# Patient Record
Sex: MALE | Race: WHITE | NOT HISPANIC OR LATINO
[De-identification: names, ages, dates, MRNs, and addresses within clinical notes are randomized per-mention and may not be internally consistent; named-entity substitution may affect disease eponyms.]

---

## 2017-01-09 ENCOUNTER — APPOINTMENT (OUTPATIENT)
Dept: ORTHOPEDIC SURGERY | Facility: CLINIC | Age: 72
End: 2017-01-09
Payer: MEDICARE

## 2017-01-09 PROCEDURE — 99214 OFFICE O/P EST MOD 30 MIN: CPT

## 2019-02-11 ENCOUNTER — APPOINTMENT (OUTPATIENT)
Dept: ORTHOPEDIC SURGERY | Facility: CLINIC | Age: 74
End: 2019-02-11
Payer: MEDICARE

## 2019-02-11 PROCEDURE — 72100 X-RAY EXAM L-S SPINE 2/3 VWS: CPT

## 2019-02-11 PROCEDURE — 99215 OFFICE O/P EST HI 40 MIN: CPT

## 2019-02-11 NOTE — PHYSICAL EXAM
[] : Motor: [Normal] : Oriented to person, place, and time, insight and judgement were intact and the affect was normal [Motor Strength Lower Extremities] : left (weak) [NL] : normal and symmetric bilaterally [UE/LE] : Sensory: Intact in bilateral upper & lower extremities [0] : right patella 0 [1+] : left patella 1+ [ALL] : dorsalis pedis, posterior tibial, femoral, popliteal, and radial 2+ and symmetric bilaterally [Antalgic] : antalgic [Michael's Sign] : negative Michael's sign [Pronator Drift] : negative pronator drift [SLR] : negative straight leg raise [de-identified] : Range of Motion: is painful, but is full. Lumbar. \par NTTP L spine and b/l paraspinals lumbar region\par Skin intact L spine and all 4 extremities. No rashes, ulcers, blisters. \par No lymphedema. 	\par  \par  [de-identified] : 2 views AP and Lat of Lumbar Spine from [id number] 02/11/19. Read and dictated by Dr. Dalton Cárdenas Board Certified Orthopaedic surgeon isthmic spondylolisthesis L5S1\par \par XRAY of L Spine - isthmic spondylolisthesis L5S1 grade 1, Scoliosis\par MRI of L Spine - isthmic spondylolisthesis L5S1, foraminal stenosis. \par

## 2019-02-11 NOTE — HISTORY OF PRESENT ILLNESS
[Prolonged Standing] : worsened by prolonged standing [Walking] : worsened by walking [Chiropractic] : relieved by chiropractic manipulation [Rest] : relieved by rest [Acetaminophen] : relieved by acetaminophen [NSAIDs] : relieved by nonsteroidal anti-inflammatory drugs [de-identified] : Pt with isthmic spondylolisthesis and spinal stenosis with L5 radiculopathy, failed conservative management and care. Presents today for for follow up. Symptoms are stable since last visit. Pt states he feels like he walks on "sandpaper" with left foot. He denies radiating pain or weakness to B/L LE . Pt takes Tylenol and Advil PRN. He had 2 lumbar HEATHER's by Dr. Sow, which helped 1-2 days.  He had PT in the past. Pt's wife states he doesn’t remember having HEATHRE in the past, she has concerns about memory loss [Bending] : not worsened by bending [Physical Therapy] : not relieved by physical therapy [Incontinence] : no incontinence [Urinary Ret.] : no urinary retention

## 2019-02-11 NOTE — DISCUSSION/SUMMARY
[de-identified] : 74 yo male isthmic spondylolisthesis, spinal stenosis with L5 radiculopathy, failed conservative management and care. Discussed several options, including surgical and nonsurgical. Posterior Lumbar laminectomy and instrumented spinal fusion. Update lumbar MRI. \par \par I reviewed all major risks, benefits, and complications associated with surgical intervention including but not limited to bleeding, infection, neurological injury, CSF leak, implant failure, implant migration, pedicle screw breech, pseudarthrosis, adjacent segment degeneration, hematoma, anesthetic risk, chronic pain and disability , medical complications (GI, , DVT, PE, cardiac, pulmonary, etc) and risk of mortality.\par \par  \par

## 2019-02-13 ENCOUNTER — OTHER (OUTPATIENT)
Age: 74
End: 2019-02-13

## 2021-03-15 ENCOUNTER — APPOINTMENT (OUTPATIENT)
Dept: ORTHOPEDIC SURGERY | Facility: CLINIC | Age: 76
End: 2021-03-15
Payer: MEDICARE

## 2021-03-15 PROCEDURE — 99215 OFFICE O/P EST HI 40 MIN: CPT

## 2021-03-15 PROCEDURE — 72100 X-RAY EXAM L-S SPINE 2/3 VWS: CPT

## 2021-03-17 ENCOUNTER — APPOINTMENT (OUTPATIENT)
Dept: MRI IMAGING | Facility: CLINIC | Age: 76
End: 2021-03-17
Payer: MEDICARE

## 2021-03-17 ENCOUNTER — OUTPATIENT (OUTPATIENT)
Dept: OUTPATIENT SERVICES | Facility: HOSPITAL | Age: 76
LOS: 1 days | End: 2021-03-17
Payer: MEDICARE

## 2021-03-17 DIAGNOSIS — M48.07 SPINAL STENOSIS, LUMBOSACRAL REGION: ICD-10-CM

## 2021-03-17 PROCEDURE — 72148 MRI LUMBAR SPINE W/O DYE: CPT

## 2021-03-17 PROCEDURE — G1004: CPT

## 2021-03-17 PROCEDURE — 72148 MRI LUMBAR SPINE W/O DYE: CPT | Mod: 26,ME

## 2021-03-22 ENCOUNTER — APPOINTMENT (OUTPATIENT)
Dept: ORTHOPEDIC SURGERY | Facility: CLINIC | Age: 76
End: 2021-03-22
Payer: MEDICARE

## 2021-03-22 PROCEDURE — 99215 OFFICE O/P EST HI 40 MIN: CPT

## 2021-04-20 ENCOUNTER — OUTPATIENT (OUTPATIENT)
Dept: OUTPATIENT SERVICES | Facility: HOSPITAL | Age: 76
LOS: 1 days | End: 2021-04-20
Payer: MEDICARE

## 2021-04-20 VITALS
TEMPERATURE: 98 F | WEIGHT: 175.05 LBS | SYSTOLIC BLOOD PRESSURE: 118 MMHG | RESPIRATION RATE: 16 BRPM | HEIGHT: 72 IN | DIASTOLIC BLOOD PRESSURE: 84 MMHG | HEART RATE: 93 BPM | OXYGEN SATURATION: 97 %

## 2021-04-20 DIAGNOSIS — Z29.9 ENCOUNTER FOR PROPHYLACTIC MEASURES, UNSPECIFIED: ICD-10-CM

## 2021-04-20 DIAGNOSIS — Z01.818 ENCOUNTER FOR OTHER PREPROCEDURAL EXAMINATION: ICD-10-CM

## 2021-04-20 DIAGNOSIS — M48.07 SPINAL STENOSIS, LUMBOSACRAL REGION: ICD-10-CM

## 2021-04-20 DIAGNOSIS — I48.91 UNSPECIFIED ATRIAL FIBRILLATION: ICD-10-CM

## 2021-04-20 LAB
ANION GAP SERPL CALC-SCNC: 17 MMOL/L — SIGNIFICANT CHANGE UP (ref 5–17)
BLD GP AB SCN SERPL QL: NEGATIVE — SIGNIFICANT CHANGE UP
BUN SERPL-MCNC: 25 MG/DL — HIGH (ref 7–23)
CALCIUM SERPL-MCNC: 9.2 MG/DL — SIGNIFICANT CHANGE UP (ref 8.4–10.5)
CHLORIDE SERPL-SCNC: 98 MMOL/L — SIGNIFICANT CHANGE UP (ref 96–108)
CO2 SERPL-SCNC: 23 MMOL/L — SIGNIFICANT CHANGE UP (ref 22–31)
CREAT SERPL-MCNC: 0.94 MG/DL — SIGNIFICANT CHANGE UP (ref 0.5–1.3)
GLUCOSE SERPL-MCNC: 75 MG/DL — SIGNIFICANT CHANGE UP (ref 70–99)
HCT VFR BLD CALC: 45.2 % — SIGNIFICANT CHANGE UP (ref 39–50)
HGB BLD-MCNC: 15.6 G/DL — SIGNIFICANT CHANGE UP (ref 13–17)
MCHC RBC-ENTMCNC: 34.5 GM/DL — SIGNIFICANT CHANGE UP (ref 32–36)
MCHC RBC-ENTMCNC: 36 PG — HIGH (ref 27–34)
MCV RBC AUTO: 104.4 FL — HIGH (ref 80–100)
NRBC # BLD: 0 /100 WBCS — SIGNIFICANT CHANGE UP (ref 0–0)
PLATELET # BLD AUTO: 247 K/UL — SIGNIFICANT CHANGE UP (ref 150–400)
POTASSIUM SERPL-MCNC: 4.8 MMOL/L — SIGNIFICANT CHANGE UP (ref 3.5–5.3)
POTASSIUM SERPL-SCNC: 4.8 MMOL/L — SIGNIFICANT CHANGE UP (ref 3.5–5.3)
RBC # BLD: 4.33 M/UL — SIGNIFICANT CHANGE UP (ref 4.2–5.8)
RBC # FLD: 11.9 % — SIGNIFICANT CHANGE UP (ref 10.3–14.5)
RH IG SCN BLD-IMP: NEGATIVE — SIGNIFICANT CHANGE UP
SODIUM SERPL-SCNC: 138 MMOL/L — SIGNIFICANT CHANGE UP (ref 135–145)
WBC # BLD: 5.9 K/UL — SIGNIFICANT CHANGE UP (ref 3.8–10.5)
WBC # FLD AUTO: 5.9 K/UL — SIGNIFICANT CHANGE UP (ref 3.8–10.5)

## 2021-04-20 PROCEDURE — G0463: CPT

## 2021-04-20 PROCEDURE — 86901 BLOOD TYPING SEROLOGIC RH(D): CPT

## 2021-04-20 PROCEDURE — 80048 BASIC METABOLIC PNL TOTAL CA: CPT

## 2021-04-20 PROCEDURE — 85027 COMPLETE CBC AUTOMATED: CPT

## 2021-04-20 PROCEDURE — 87640 STAPH A DNA AMP PROBE: CPT

## 2021-04-20 PROCEDURE — 87641 MR-STAPH DNA AMP PROBE: CPT

## 2021-04-20 PROCEDURE — 83036 HEMOGLOBIN GLYCOSYLATED A1C: CPT

## 2021-04-20 PROCEDURE — 86850 RBC ANTIBODY SCREEN: CPT

## 2021-04-20 PROCEDURE — 86900 BLOOD TYPING SEROLOGIC ABO: CPT

## 2021-04-20 RX ORDER — CHLORHEXIDINE GLUCONATE 213 G/1000ML
1 SOLUTION TOPICAL ONCE
Refills: 0 | Status: DISCONTINUED | OUTPATIENT
Start: 2021-05-04 | End: 2021-05-04

## 2021-04-20 RX ORDER — LIDOCAINE HCL 20 MG/ML
0.2 VIAL (ML) INJECTION ONCE
Refills: 0 | Status: DISCONTINUED | OUTPATIENT
Start: 2021-05-04 | End: 2021-05-04

## 2021-04-20 RX ORDER — SODIUM CHLORIDE 9 MG/ML
3 INJECTION INTRAMUSCULAR; INTRAVENOUS; SUBCUTANEOUS EVERY 8 HOURS
Refills: 0 | Status: DISCONTINUED | OUTPATIENT
Start: 2021-05-04 | End: 2021-05-04

## 2021-04-20 RX ORDER — CEFAZOLIN SODIUM 1 G
2000 VIAL (EA) INJECTION ONCE
Refills: 0 | Status: DISCONTINUED | OUTPATIENT
Start: 2021-05-04 | End: 2021-05-06

## 2021-04-20 NOTE — H&P PST ADULT - NSANTHOSAYNRD_GEN_A_CORE
No. HILARIA screening performed.  STOP BANG Legend: 0-2 = LOW Risk; 3-4 = INTERMEDIATE Risk; 5-8 = HIGH Risk

## 2021-04-20 NOTE — H&P PST ADULT - NSICDXPASTMEDICALHX_GEN_ALL_CORE_FT
PAST MEDICAL HISTORY:  Afib      PAST MEDICAL HISTORY:  Afib On Aspirin, metoprolol, digoxin    Spinal stenosis

## 2021-04-20 NOTE — H&P PST ADULT - ASSESSMENT
FRANCISCAI VTE 2.0 SCORE [CLOT updated 2019]    AGE RELATED RISK FACTORS                                                       MOBILITY RELATED FACTORS  [ ] Age 41-60 years                                            (1 Point)                    [ ] Bed rest                                                        (1 Point)  [ ] Age: 61-74 years                                           (2 Points)                  [ ] Plaster cast                                                   (2 Points)  [ x] Age= 75 years                                              (3 Points)                    [ ] Bed bound for more than 72 hours                 (2 Points)    DISEASE RELATED RISK FACTORS                                               GENDER SPECIFIC FACTORS  [ x] Edema in the lower extremities                       (1 Point)              [ ] Pregnancy                                                     (1 Point)  [ ] Varicose veins                                               (1 Point)                     [ ] Post-partum < 6 weeks                                   (1 Point)             [ ] BMI > 25 Kg/m2                                            (1 Point)                     [ ] Hormonal therapy  or oral contraception          (1 Point)                 [ ] Sepsis (in the previous month)                        (1 Point)               [ ] History of pregnancy complications                 (1 point)  [ ] Pneumonia or serious lung disease                                               [ ] Unexplained or recurrent                     (1 Point)           (in the previous month)                               (1 Point)  [ ] Abnormal pulmonary function test                     (1 Point)                 SURGERY RELATED RISK FACTORS  [ ] Acute myocardial infarction                              (1 Point)               [ ]  Section                                             (1 Point)  [ ] Congestive heart failure (in the previous month)  (1 Point)      [ ] Minor surgery                                                  (1 Point)   [ ] Inflammatory bowel disease                             (1 Point)               [ ] Arthroscopic surgery                                        (2 Points)  [ ] Central venous access                                      (2 Points)                [x ] General surgery lasting more than 45 minutes (2 points)  [ ] Malignancy- Present or previous                   (2 Points)                [ ] Elective arthroplasty                                         (5 points)    [ ] Stroke (in the previous month)                          (5 Points)                                                                                                                                                           HEMATOLOGY RELATED FACTORS                                                 TRAUMA RELATED RISK FACTORS  [ ] Prior episodes of VTE                                     (3 Points)                [ ] Fracture of the hip, pelvis, or leg                       (5 Points)  [ ] Positive family history for VTE                         (3 Points)             [ ] Acute spinal cord injury (in the previous month)  (5 Points)  [ ] Prothrombin 57848 A                                     (3 Points)               [ ] Paralysis  (less than 1 month)                             (5 Points)  [ ] Factor V Leiden                                             (3 Points)                  [ ] Multiple Trauma within 1 month                        (5 Points)  [ ] Lupus anticoagulants                                     (3 Points)                                                           [ ] Anticardiolipin antibodies                               (3 Points)                                                       [ ] High homocysteine in the blood                      (3 Points)                                             [ ] Other congenital or acquired thrombophilia      (3 Points)                                                [ ] Heparin induced thrombocytopenia                  (3 Points)                                     Total Score [    6      ]

## 2021-04-20 NOTE — H&P PST ADULT - ATTENDING COMMENTS
Posterior Lumbar Laminectomy and Instrumented Spinal Fusion with Interbody L3-S1 with mazor robot and navigation. I reviewed all major risks, benefits, and complications.

## 2021-04-20 NOTE — H&P PST ADULT - NSICDXPROBLEM_GEN_ALL_CORE_FT
PROBLEM DIAGNOSES  Problem: Spinal stenosis of lumbosacral region  Assessment and Plan:     Problem: Need for prophylactic measure  Assessment and Plan: The Caprini score indicates that this patient is at high risk for a VTE event (score 6 or greater). Surgical patients in this group will benefit from both pharmacologic prophylaxis and intermittent compression devices.  The surgical team will determine the balance between VTE risk and bleeding risk, and other clinical considerations         PROBLEM DIAGNOSES  Problem: Spinal stenosis of lumbosacral region  Assessment and Plan: Scheduled for L3-S1 Posterior Lumbar Laminectomy L5-S1 Instrumented spinal fusion on 05/04/2021.  Labs  Pre Op medication / education discussed      Problem: Afib  Assessment and Plan: On  Asa, metoprolol and digoxin . Advised pt to ask cardiologist for Asa plan.    Problem: Need for prophylactic measure  Assessment and Plan: The Caprini score indicates that this patient is at high risk for a VTE event (score 6 or greater). Surgical patients in this group will benefit from both pharmacologic prophylaxis and intermittent compression devices.  The surgical team will determine the balance between VTE risk and bleeding risk, and other clinical considerations         PROBLEM DIAGNOSES  Problem: Spinal stenosis of lumbosacral region  Assessment and Plan: Scheduled for L3-S1 Posterior Lumbar Laminectomy L5-S1 Instrumented spinal fusion on 05/04/2021.  Labs  Pre Op medication / education discussed      Problem: Afib  Assessment and Plan: Continue Asa ( advised by Dr Cárdenas) , metoprolol and digoxin .     Problem: Need for prophylactic measure  Assessment and Plan: The Caprini score indicates that this patient is at high risk for a VTE event (score 6 or greater). Surgical patients in this group will benefit from both pharmacologic prophylaxis and intermittent compression devices.  The surgical team will determine the balance between VTE risk and bleeding risk, and other clinical considerations

## 2021-04-20 NOTE — H&P PST ADULT - HISTORY OF PRESENT ILLNESS
76 y/o male  74 y/o male h/o afib  76 y/o male smoker h/o afib ,,isthmic spondylolisthesis and spinal stenosis with L5 radiculopathy who  failed conservative management is scheduled for L3-S1 Posterior Lumbar Laminectomy L5-S1 Instrumented spinal fusion on 05/04/2021.  Denies Recent travel, Exposure or Covid symptoms  Covid PCR Test on 05/01/2021

## 2021-04-20 NOTE — H&P PST ADULT - NS MD HP INPLANTS MED DEV
Problem: Pain:  Goal: Pain level will decrease  Description: Pain level will decrease  Outcome: Ongoing  Goal: Control of acute pain  Description: Control of acute pain  Outcome: Ongoing  Goal: Control of chronic pain  Description: Control of chronic pain  Outcome: Ongoing     Problem: Falls - Risk of:  Goal: Will remain free from falls  Description: Will remain free from falls  Outcome: Ongoing  Goal: Absence of physical injury  Description: Absence of physical injury  Outcome: Ongoing     Problem: Skin Integrity:  Goal: Will show no infection signs and symptoms  Description: Will show no infection signs and symptoms  Outcome: Ongoing  Goal: Absence of new skin breakdown  Description: Absence of new skin breakdown  Outcome: Ongoing     Problem: Cardiac:  Goal: Ability to maintain an adequate cardiac output will improve  Description: Ability to maintain an adequate cardiac output will improve  Outcome: Ongoing  Goal: Hemodynamic stability will improve  Description: Hemodynamic stability will improve  Outcome: Ongoing     Problem: Fluid Volume:  Goal: Ability to achieve and maintain adequate urine output will improve  Description: Ability to achieve and maintain adequate urine output will improve  Outcome: Ongoing     Problem: Respiratory:  Goal: Respiratory status will improve  Description: Respiratory status will improve  Outcome: Ongoing None

## 2021-04-21 LAB
A1C WITH ESTIMATED AVERAGE GLUCOSE RESULT: 5.1 % — SIGNIFICANT CHANGE UP (ref 4–5.6)
ESTIMATED AVERAGE GLUCOSE: 100 MG/DL — SIGNIFICANT CHANGE UP (ref 68–114)
MRSA PCR RESULT.: SIGNIFICANT CHANGE UP
S AUREUS DNA NOSE QL NAA+PROBE: DETECTED

## 2021-04-27 PROBLEM — I48.91 UNSPECIFIED ATRIAL FIBRILLATION: Chronic | Status: ACTIVE | Noted: 2021-04-20

## 2021-04-27 PROBLEM — M48.00 SPINAL STENOSIS, SITE UNSPECIFIED: Chronic | Status: ACTIVE | Noted: 2021-04-20

## 2021-05-01 ENCOUNTER — OUTPATIENT (OUTPATIENT)
Dept: OUTPATIENT SERVICES | Facility: HOSPITAL | Age: 76
LOS: 1 days | End: 2021-05-01

## 2021-05-01 DIAGNOSIS — Z11.52 ENCOUNTER FOR SCREENING FOR COVID-19: ICD-10-CM

## 2021-05-01 LAB — SARS-COV-2 RNA SPEC QL NAA+PROBE: SIGNIFICANT CHANGE UP

## 2021-05-03 ENCOUNTER — TRANSCRIPTION ENCOUNTER (OUTPATIENT)
Age: 76
End: 2021-05-03

## 2021-05-04 ENCOUNTER — APPOINTMENT (OUTPATIENT)
Dept: ORTHOPEDIC SURGERY | Facility: HOSPITAL | Age: 76
End: 2021-05-04

## 2021-05-04 ENCOUNTER — RESULT REVIEW (OUTPATIENT)
Age: 76
End: 2021-05-04

## 2021-05-04 ENCOUNTER — INPATIENT (INPATIENT)
Facility: HOSPITAL | Age: 76
LOS: 1 days | Discharge: HOME | DRG: 454 | End: 2021-05-06
Attending: ORTHOPAEDIC SURGERY | Admitting: ORTHOPAEDIC SURGERY
Payer: MEDICARE

## 2021-05-04 VITALS
RESPIRATION RATE: 18 BRPM | SYSTOLIC BLOOD PRESSURE: 122 MMHG | OXYGEN SATURATION: 96 % | TEMPERATURE: 98 F | WEIGHT: 173.06 LBS | DIASTOLIC BLOOD PRESSURE: 83 MMHG | HEART RATE: 91 BPM | HEIGHT: 72 IN

## 2021-05-04 DIAGNOSIS — M48.07 SPINAL STENOSIS, LUMBOSACRAL REGION: ICD-10-CM

## 2021-05-04 DIAGNOSIS — Z11.52 ENCOUNTER FOR SCREENING FOR COVID-19: ICD-10-CM

## 2021-05-04 DIAGNOSIS — M48.061 SPINAL STENOSIS, LUMBAR REGION WITHOUT NEUROGENIC CLAUDICATION: ICD-10-CM

## 2021-05-04 LAB
ANION GAP SERPL CALC-SCNC: 17 MMOL/L — SIGNIFICANT CHANGE UP (ref 5–17)
BASOPHILS # BLD AUTO: 0.01 K/UL — SIGNIFICANT CHANGE UP (ref 0–0.2)
BASOPHILS NFR BLD AUTO: 0.1 % — SIGNIFICANT CHANGE UP (ref 0–2)
BUN SERPL-MCNC: 17 MG/DL — SIGNIFICANT CHANGE UP (ref 7–23)
CALCIUM SERPL-MCNC: 8.1 MG/DL — LOW (ref 8.4–10.5)
CHLORIDE SERPL-SCNC: 102 MMOL/L — SIGNIFICANT CHANGE UP (ref 96–108)
CO2 SERPL-SCNC: 19 MMOL/L — LOW (ref 22–31)
CREAT SERPL-MCNC: 0.75 MG/DL — SIGNIFICANT CHANGE UP (ref 0.5–1.3)
EOSINOPHIL # BLD AUTO: 0 K/UL — SIGNIFICANT CHANGE UP (ref 0–0.5)
EOSINOPHIL NFR BLD AUTO: 0 % — SIGNIFICANT CHANGE UP (ref 0–6)
GAS PNL BLDA: SIGNIFICANT CHANGE UP
GLUCOSE SERPL-MCNC: 108 MG/DL — HIGH (ref 70–99)
HCT VFR BLD CALC: 38.1 % — LOW (ref 39–50)
HGB BLD-MCNC: 13.2 G/DL — SIGNIFICANT CHANGE UP (ref 13–17)
IMM GRANULOCYTES NFR BLD AUTO: 0.6 % — SIGNIFICANT CHANGE UP (ref 0–1.5)
LYMPHOCYTES # BLD AUTO: 0.51 K/UL — LOW (ref 1–3.3)
LYMPHOCYTES # BLD AUTO: 7.1 % — LOW (ref 13–44)
MCHC RBC-ENTMCNC: 34.6 GM/DL — SIGNIFICANT CHANGE UP (ref 32–36)
MCHC RBC-ENTMCNC: 36.2 PG — HIGH (ref 27–34)
MCV RBC AUTO: 104.4 FL — HIGH (ref 80–100)
MONOCYTES # BLD AUTO: 0.05 K/UL — SIGNIFICANT CHANGE UP (ref 0–0.9)
MONOCYTES NFR BLD AUTO: 0.7 % — LOW (ref 2–14)
NEUTROPHILS # BLD AUTO: 6.58 K/UL — SIGNIFICANT CHANGE UP (ref 1.8–7.4)
NEUTROPHILS NFR BLD AUTO: 91.5 % — HIGH (ref 43–77)
NRBC # BLD: 0 /100 WBCS — SIGNIFICANT CHANGE UP (ref 0–0)
PLATELET # BLD AUTO: 166 K/UL — SIGNIFICANT CHANGE UP (ref 150–400)
POTASSIUM SERPL-MCNC: 4.4 MMOL/L — SIGNIFICANT CHANGE UP (ref 3.5–5.3)
POTASSIUM SERPL-SCNC: 4.4 MMOL/L — SIGNIFICANT CHANGE UP (ref 3.5–5.3)
RBC # BLD: 3.65 M/UL — LOW (ref 4.2–5.8)
RBC # FLD: 11.7 % — SIGNIFICANT CHANGE UP (ref 10.3–14.5)
RH IG SCN BLD-IMP: NEGATIVE — SIGNIFICANT CHANGE UP
SODIUM SERPL-SCNC: 138 MMOL/L — SIGNIFICANT CHANGE UP (ref 135–145)
WBC # BLD: 7.19 K/UL — SIGNIFICANT CHANGE UP (ref 3.8–10.5)
WBC # FLD AUTO: 7.19 K/UL — SIGNIFICANT CHANGE UP (ref 3.8–10.5)

## 2021-05-04 PROCEDURE — 63012 REMOVE LAMINA/FACETS LUMBAR: CPT | Mod: 59

## 2021-05-04 PROCEDURE — 72131 CT LUMBAR SPINE W/O DYE: CPT | Mod: 26

## 2021-05-04 PROCEDURE — 61783 SCAN PROC SPINAL: CPT | Mod: 59

## 2021-05-04 PROCEDURE — 88304 TISSUE EXAM BY PATHOLOGIST: CPT | Mod: 26

## 2021-05-04 PROCEDURE — 22840 INSERT SPINE FIXATION DEVICE: CPT

## 2021-05-04 PROCEDURE — 22853 INSJ BIOMECHANICAL DEVICE: CPT

## 2021-05-04 PROCEDURE — 63047 LAM FACETEC & FORAMOT LUMBAR: CPT | Mod: 59

## 2021-05-04 PROCEDURE — 63056 DECOMPRESS SPINAL CORD LMBR: CPT | Mod: 59

## 2021-05-04 PROCEDURE — 22633 ARTHRD CMBN 1NTRSPC LUMBAR: CPT

## 2021-05-04 RX ORDER — DEXAMETHASONE 0.5 MG/5ML
1 ELIXIR ORAL EVERY 6 HOURS
Refills: 0 | Status: DISCONTINUED | OUTPATIENT
Start: 2021-05-06 | End: 2021-05-06

## 2021-05-04 RX ORDER — HYDROMORPHONE HYDROCHLORIDE 2 MG/ML
0.5 INJECTION INTRAMUSCULAR; INTRAVENOUS; SUBCUTANEOUS
Refills: 0 | Status: DISCONTINUED | OUTPATIENT
Start: 2021-05-04 | End: 2021-05-05

## 2021-05-04 RX ORDER — DEXAMETHASONE 0.5 MG/5ML
5 ELIXIR ORAL EVERY 6 HOURS
Refills: 0 | Status: COMPLETED | OUTPATIENT
Start: 2021-05-04 | End: 2021-05-05

## 2021-05-04 RX ORDER — OXYCODONE HYDROCHLORIDE 5 MG/1
5 TABLET ORAL EVERY 4 HOURS
Refills: 0 | Status: DISCONTINUED | OUTPATIENT
Start: 2021-05-04 | End: 2021-05-06

## 2021-05-04 RX ORDER — SENNA PLUS 8.6 MG/1
2 TABLET ORAL AT BEDTIME
Refills: 0 | Status: DISCONTINUED | OUTPATIENT
Start: 2021-05-04 | End: 2021-05-06

## 2021-05-04 RX ORDER — FOLIC ACID 0.8 MG
1 TABLET ORAL DAILY
Refills: 0 | Status: DISCONTINUED | OUTPATIENT
Start: 2021-05-04 | End: 2021-05-06

## 2021-05-04 RX ORDER — SODIUM CHLORIDE 9 MG/ML
1000 INJECTION, SOLUTION INTRAVENOUS
Refills: 0 | Status: DISCONTINUED | OUTPATIENT
Start: 2021-05-04 | End: 2021-05-06

## 2021-05-04 RX ORDER — DIGOXIN 250 MCG
250 TABLET ORAL DAILY
Refills: 0 | Status: DISCONTINUED | OUTPATIENT
Start: 2021-05-04 | End: 2021-05-06

## 2021-05-04 RX ORDER — MORPHINE SULFATE 50 MG/1
2 CAPSULE, EXTENDED RELEASE ORAL EVERY 4 HOURS
Refills: 0 | Status: DISCONTINUED | OUTPATIENT
Start: 2021-05-04 | End: 2021-05-06

## 2021-05-04 RX ORDER — FAMOTIDINE 10 MG/ML
20 INJECTION INTRAVENOUS EVERY 12 HOURS
Refills: 0 | Status: DISCONTINUED | OUTPATIENT
Start: 2021-05-04 | End: 2021-05-06

## 2021-05-04 RX ORDER — LANOLIN ALCOHOL/MO/W.PET/CERES
3 CREAM (GRAM) TOPICAL AT BEDTIME
Refills: 0 | Status: DISCONTINUED | OUTPATIENT
Start: 2021-05-04 | End: 2021-05-06

## 2021-05-04 RX ORDER — DIPHENHYDRAMINE HCL 50 MG
12.5 CAPSULE ORAL EVERY 4 HOURS
Refills: 0 | Status: DISCONTINUED | OUTPATIENT
Start: 2021-05-04 | End: 2021-05-06

## 2021-05-04 RX ORDER — DEXAMETHASONE 0.5 MG/5ML
3 ELIXIR ORAL EVERY 6 HOURS
Refills: 0 | Status: DISCONTINUED | OUTPATIENT
Start: 2021-05-05 | End: 2021-05-06

## 2021-05-04 RX ORDER — SODIUM CHLORIDE 9 MG/ML
500 INJECTION, SOLUTION INTRAVENOUS ONCE
Refills: 0 | Status: COMPLETED | OUTPATIENT
Start: 2021-05-04 | End: 2021-05-05

## 2021-05-04 RX ORDER — ACETAMINOPHEN 500 MG
1000 TABLET ORAL ONCE
Refills: 0 | Status: COMPLETED | OUTPATIENT
Start: 2021-05-04 | End: 2021-05-04

## 2021-05-04 RX ORDER — ASCORBIC ACID 60 MG
500 TABLET,CHEWABLE ORAL
Refills: 0 | Status: DISCONTINUED | OUTPATIENT
Start: 2021-05-04 | End: 2021-05-06

## 2021-05-04 RX ORDER — OXYCODONE HYDROCHLORIDE 5 MG/1
10 TABLET ORAL EVERY 4 HOURS
Refills: 0 | Status: DISCONTINUED | OUTPATIENT
Start: 2021-05-04 | End: 2021-05-06

## 2021-05-04 RX ORDER — ONDANSETRON 8 MG/1
4 TABLET, FILM COATED ORAL EVERY 6 HOURS
Refills: 0 | Status: DISCONTINUED | OUTPATIENT
Start: 2021-05-04 | End: 2021-05-06

## 2021-05-04 RX ORDER — CYCLOBENZAPRINE HYDROCHLORIDE 10 MG/1
10 TABLET, FILM COATED ORAL EVERY 8 HOURS
Refills: 0 | Status: DISCONTINUED | OUTPATIENT
Start: 2021-05-04 | End: 2021-05-06

## 2021-05-04 RX ORDER — ACETAMINOPHEN 500 MG
975 TABLET ORAL EVERY 8 HOURS
Refills: 0 | Status: DISCONTINUED | OUTPATIENT
Start: 2021-05-04 | End: 2021-05-06

## 2021-05-04 RX ORDER — ASPIRIN/CALCIUM CARB/MAGNESIUM 324 MG
81 TABLET ORAL DAILY
Refills: 0 | Status: DISCONTINUED | OUTPATIENT
Start: 2021-05-04 | End: 2021-05-06

## 2021-05-04 RX ORDER — CEFAZOLIN SODIUM 1 G
2000 VIAL (EA) INJECTION EVERY 8 HOURS
Refills: 0 | Status: COMPLETED | OUTPATIENT
Start: 2021-05-04 | End: 2021-05-05

## 2021-05-04 RX ORDER — TRAMADOL HYDROCHLORIDE 50 MG/1
50 TABLET ORAL EVERY 4 HOURS
Refills: 0 | Status: DISCONTINUED | OUTPATIENT
Start: 2021-05-04 | End: 2021-05-06

## 2021-05-04 RX ORDER — SODIUM CHLORIDE 9 MG/ML
1000 INJECTION, SOLUTION INTRAVENOUS
Refills: 0 | Status: DISCONTINUED | OUTPATIENT
Start: 2021-05-04 | End: 2021-05-04

## 2021-05-04 RX ORDER — BENZOCAINE AND MENTHOL 5; 1 G/100ML; G/100ML
1 LIQUID ORAL
Refills: 0 | Status: DISCONTINUED | OUTPATIENT
Start: 2021-05-04 | End: 2021-05-06

## 2021-05-04 RX ORDER — METOPROLOL TARTRATE 50 MG
150 TABLET ORAL
Refills: 0 | Status: DISCONTINUED | OUTPATIENT
Start: 2021-05-04 | End: 2021-05-06

## 2021-05-04 RX ORDER — MAGNESIUM HYDROXIDE 400 MG/1
30 TABLET, CHEWABLE ORAL EVERY 12 HOURS
Refills: 0 | Status: DISCONTINUED | OUTPATIENT
Start: 2021-05-04 | End: 2021-05-06

## 2021-05-04 RX ADMIN — SODIUM CHLORIDE 100 MILLILITER(S): 9 INJECTION, SOLUTION INTRAVENOUS at 21:21

## 2021-05-04 RX ADMIN — Medication 5 MILLIGRAM(S): at 21:33

## 2021-05-04 RX ADMIN — Medication 1 TABLET(S): at 22:29

## 2021-05-04 RX ADMIN — TRAMADOL HYDROCHLORIDE 50 MILLIGRAM(S): 50 TABLET ORAL at 21:23

## 2021-05-04 RX ADMIN — SENNA PLUS 2 TABLET(S): 8.6 TABLET ORAL at 21:34

## 2021-05-04 RX ADMIN — Medication 400 MILLIGRAM(S): at 21:08

## 2021-05-04 RX ADMIN — Medication 1000 MILLIGRAM(S): at 21:40

## 2021-05-04 RX ADMIN — TRAMADOL HYDROCHLORIDE 50 MILLIGRAM(S): 50 TABLET ORAL at 22:30

## 2021-05-04 NOTE — CHART NOTE - NSCHARTNOTEFT_GEN_A_CORE
Patient Resting without complaints.  No Chest Pain, SOB, N/V.  Pre op s/sx: LLE radiculopathy with weakness    T(C): 36.4 (05-04-21 @ 22:00), Max: 36.6 (05-04-21 @ 20:25)  HR: 98 (05-04-21 @ 21:30) (91 - 104)  BP: 95/61 (05-04-21 @ 21:30) (86/55 - 122/83)  RR: 20 (05-04-21 @ 22:00) (15 - 20)  SpO2: 97% (05-04-21 @ 21:30) (96% - 100%)  Wt(kg): --    Exam:   Alert/Oriented, No Acute Distress  Cards: +S1/S2, RRR  Pulm: CTAB           Dressing: [x] clean/dry/intact  [ ] Other:           Sensation: [ ] intact to light touch  [x] decreased: to LLE but states has improved since surgery         Motor exam: [  ]          [ ] Upper extremity                Bi          Tri        Delt                                                    R         5/5        5/5        5/5                                               L          5/5        5/5        5/5                  [ ] Lower extremity                    PF          DF         EHL       FHL                                                                                            R        5/5        4/5        4/5       5/5                                                        L         5/5       4/5        4/5       5/5                                                                  Calves Soft/Non-tender bilaterally           Toes warm well perfused; capillary refill <3 seconds              LABS:                        13.2   7.19  )-----------( 166      ( 04 May 2021 21:04 )             38.1     05-04    138  |  102  |  17  ----------------------------<  108<H>  4.4   |  19<L>  |  0.75    Ca    8.1<L>      04 May 2021 21:04          A/P :  Patient is a 75y Male s/p L3-S1 Kodak/lami, L5-S1 PSF/TLIF  -    Pain control  -    Taper  -    Resume A81 daily  -    For TOV in AM with PT  -    DVT ppx: SCDs       -    Physical Therapy  -    Weight bearing status: WBAT with LSO brace for ambulation; patient may be OOB with PT prior to brace arrival  -    FU AM Labs      Shannon Gonzalez PA-C  Team Pager #[phone] Patient Resting without complaints.  No Chest Pain, SOB, N/V.  Pre op s/sx: LLE radiculopathy with weakness    T(C): 36.4 (05-04-21 @ 22:00), Max: 36.6 (05-04-21 @ 20:25)  HR: 98 (05-04-21 @ 21:30) (91 - 104)  BP: 95/61 (05-04-21 @ 21:30) (86/55 - 122/83)  RR: 20 (05-04-21 @ 22:00) (15 - 20)  SpO2: 97% (05-04-21 @ 21:30) (96% - 100%)  Wt(kg): --    Exam:   Alert/Oriented, No Acute Distress  Cards: +S1/S2, RRR  Pulm: CTAB           Dressing: [x] clean/dry/intact  [ ] Other:           Sensation: [ ] intact to light touch  [x] decreased: to LLE but states has improved since surgery         Motor exam: [  ]          [ ] Upper extremity                Bi          Tri        Delt                                                    R         5/5        5/5        5/5                                               L          5/5        5/5        5/5                  [ ] Lower extremity                    PF          DF         EHL       FHL                                                                                            R        5/5        4/5        4/5       5/5                                                        L         5/5       4/5        4/5       5/5                                                                  Calves Soft/Non-tender bilaterally           Toes warm well perfused; capillary refill <3 seconds              LABS:                        13.2   7.19  )-----------( 166      ( 04 May 2021 21:04 )             38.1     05-04    138  |  102  |  17  ----------------------------<  108<H>  4.4   |  19<L>  |  0.75    Ca    8.1<L>      04 May 2021 21:04          A/P :  Patient is a 75y Male s/p L3-S1 Kodak/lami, L5-S1 PSF/TLIF  -    Pain control  -    Taper  -    Resume A81 daily  -    For TOV in AM with PT  -    FU CT Chest (incidental finding of right lung nodule on CXR)  -    DVT ppx: SCDs       -    Physical Therapy  -    Weight bearing status: WBAT with LSO brace for ambulation; patient may be OOB with PT prior to brace arrival  -    FU AM Labs      Shannon Gonzalez PA-C  Team Pager #[phone]

## 2021-05-04 NOTE — BRIEF OPERATIVE NOTE - OPERATION/FINDINGS
lumbar spinal stenosis, foraminal stenosis, L5-S1 isthmic spondy, Left L3-S1 michela lami, L L5-S1 TLIF and PSF

## 2021-05-04 NOTE — PRE-ANESTHESIA EVALUATION ADULT - NSANTHPMHFT_GEN_ALL_CORE
76 y/o male smoker h/o afib ,,isthmic spondylolisthesis and spinal stenosis with L5 radiculopathy who  failed conservative management is scheduled for L3-S1 Posterior Lumbar Laminectomy L5-S1 Instrumented spinal fusion on 05/04/2021.  Denies Recent travel, Exposure or Covid symptoms

## 2021-05-04 NOTE — CHART NOTE - NSCHARTNOTEFT_GEN_A_CORE
CAPRINI SCORE [CLOT]    AGE RELATED RISK FACTORS                                                       MOBILITY RELATED FACTORS  [ ] Age 41-60 years                                            (1 Point)                  [ ] Bed rest                                                        (1 Point)  [ ] Age: 61-74 years                                           (2 Points)                 [ ] Plaster cast                                                   (2 Points)  [x] Age= 75 years                                              (3 Points)                 [ ] Bed bound for more than 72 hours                 (2 Points)    DISEASE RELATED RISK FACTORS                                               GENDER SPECIFIC FACTORS  [ ] Edema in the lower extremities                       (1 Point)                  [ ] Pregnancy                                                     (1 Point)  [ ] Varicose veins                                               (1 Point)                  [ ] Post-partum < 6 weeks                                   (1 Point)             [ ] BMI > 25 Kg/m2                                            (1 Point)                  [ ] Hormonal therapy  or oral contraception          (1 Point)                 [ ] Sepsis (in the previous month)                        (1 Point)                  [ ] History of pregnancy complications                 (1 point)  [ ] Pneumonia or serious lung disease                                               [ ] Unexplained or recurrent                     (1 Point)           (in the previous month)                               (1 Point)  [ ] Abnormal pulmonary function test                     (1 Point)                 SURGERY RELATED RISK FACTORS  [ ] Acute myocardial infarction                              (1 Point)                 [ ]  Section                                             (1 Point)  [ ] Congestive heart failure (in the previous month)  (1 Point)               [ ] Minor surgery                                                  (1 Point)   [ ] Inflammatory bowel disease                             (1 Point)                 [ ] Arthroscopic surgery                                        (2 Points)  [ ] Central venous access                                      (2 Points)               [x] General surgery lasting more than 45 minutes   (2 Points)       [ ] Stroke (in the previous month)                          (5 Points)               [ ] Elective arthroplasty                                         (5 Points)                                                                                                                                               HEMATOLOGY RELATED FACTORS                                                 TRAUMA RELATED RISK FACTORS  [ ] Prior episodes of VTE                                     (3 Points)                [ ] Fracture of the hip, pelvis, or leg                       (5 Points)  [ ] Positive family history for VTE                         (3 Points)                 [ ] Acute spinal cord injury (in the previous month)  (5 Points)  [ ] Prothrombin 36346 A                                     (3 Points)                 [ ] Paralysis  (less than 1 month)                             (5 Points)  [ ] Factor V Leiden                                             (3 Points)                  [ ] Multiple Trauma within 1 month                        (5 Points)  [ ] Lupus anticoagulants                                     (3 Points)                                                           [ ] Anticardiolipin antibodies                               (3 Points)                                                       [ ] High homocysteine in the blood                      (3 Points)                                             [ ] Other congenital or acquired thrombophilia      (3 Points)                                                [ ] Heparin induced thrombocytopenia                  (3 Points)                                          Total Score [     5      ]    Caprini Score 0 - 2:  Low Risk, No VTE Prophylaxis required for most patients, encourage ambulation  Caprini Score 3 - 6:  At Risk, pharmacologic VTE prophylaxis is indicated for most patients (in the absence of a contraindication)  Caprini Score Greater than or = 7:  High Risk, pharmacologic VTE prophylaxis is indicated for most patients (in the absence of a contraindication)    With a calculated score of 5, this patient does not currently meet criteria for inpatient dopplers at this time. Will continue to reassess.    Dilcia Brand PA-C  Team Pager #[phone]

## 2021-05-05 LAB
ANION GAP SERPL CALC-SCNC: 14 MMOL/L — SIGNIFICANT CHANGE UP (ref 5–17)
BASOPHILS # BLD AUTO: 0.01 K/UL — SIGNIFICANT CHANGE UP (ref 0–0.2)
BASOPHILS NFR BLD AUTO: 0.1 % — SIGNIFICANT CHANGE UP (ref 0–2)
BUN SERPL-MCNC: 17 MG/DL — SIGNIFICANT CHANGE UP (ref 7–23)
CALCIUM SERPL-MCNC: 8.4 MG/DL — SIGNIFICANT CHANGE UP (ref 8.4–10.5)
CHLORIDE SERPL-SCNC: 99 MMOL/L — SIGNIFICANT CHANGE UP (ref 96–108)
CO2 SERPL-SCNC: 24 MMOL/L — SIGNIFICANT CHANGE UP (ref 22–31)
COVID-19 SPIKE DOMAIN AB INTERP: POSITIVE
COVID-19 SPIKE DOMAIN ANTIBODY RESULT: >250 U/ML — HIGH
CREAT SERPL-MCNC: 0.75 MG/DL — SIGNIFICANT CHANGE UP (ref 0.5–1.3)
EOSINOPHIL # BLD AUTO: 0 K/UL — SIGNIFICANT CHANGE UP (ref 0–0.5)
EOSINOPHIL NFR BLD AUTO: 0 % — SIGNIFICANT CHANGE UP (ref 0–6)
GLUCOSE SERPL-MCNC: 167 MG/DL — HIGH (ref 70–99)
HCT VFR BLD CALC: 36.8 % — LOW (ref 39–50)
HGB BLD-MCNC: 12.7 G/DL — LOW (ref 13–17)
IMM GRANULOCYTES NFR BLD AUTO: 0.4 % — SIGNIFICANT CHANGE UP (ref 0–1.5)
LYMPHOCYTES # BLD AUTO: 0.76 K/UL — LOW (ref 1–3.3)
LYMPHOCYTES # BLD AUTO: 9.8 % — LOW (ref 13–44)
MCHC RBC-ENTMCNC: 34.5 GM/DL — SIGNIFICANT CHANGE UP (ref 32–36)
MCHC RBC-ENTMCNC: 36.6 PG — HIGH (ref 27–34)
MCV RBC AUTO: 106.1 FL — HIGH (ref 80–100)
MONOCYTES # BLD AUTO: 0.21 K/UL — SIGNIFICANT CHANGE UP (ref 0–0.9)
MONOCYTES NFR BLD AUTO: 2.7 % — SIGNIFICANT CHANGE UP (ref 2–14)
NEUTROPHILS # BLD AUTO: 6.71 K/UL — SIGNIFICANT CHANGE UP (ref 1.8–7.4)
NEUTROPHILS NFR BLD AUTO: 87 % — HIGH (ref 43–77)
NRBC # BLD: 0 /100 WBCS — SIGNIFICANT CHANGE UP (ref 0–0)
PLATELET # BLD AUTO: 152 K/UL — SIGNIFICANT CHANGE UP (ref 150–400)
POTASSIUM SERPL-MCNC: 4.8 MMOL/L — SIGNIFICANT CHANGE UP (ref 3.5–5.3)
POTASSIUM SERPL-SCNC: 4.8 MMOL/L — SIGNIFICANT CHANGE UP (ref 3.5–5.3)
RBC # BLD: 3.47 M/UL — LOW (ref 4.2–5.8)
RBC # FLD: 11.9 % — SIGNIFICANT CHANGE UP (ref 10.3–14.5)
SARS-COV-2 IGG+IGM SERPL QL IA: >250 U/ML — HIGH
SARS-COV-2 IGG+IGM SERPL QL IA: POSITIVE
SODIUM SERPL-SCNC: 137 MMOL/L — SIGNIFICANT CHANGE UP (ref 135–145)
WBC # BLD: 7.72 K/UL — SIGNIFICANT CHANGE UP (ref 3.8–10.5)
WBC # FLD AUTO: 7.72 K/UL — SIGNIFICANT CHANGE UP (ref 3.8–10.5)

## 2021-05-05 PROCEDURE — 71250 CT THORAX DX C-: CPT | Mod: 26

## 2021-05-05 RX ADMIN — Medication 1 TABLET(S): at 12:08

## 2021-05-05 RX ADMIN — Medication 81 MILLIGRAM(S): at 12:04

## 2021-05-05 RX ADMIN — Medication 975 MILLIGRAM(S): at 13:05

## 2021-05-05 RX ADMIN — Medication 150 MILLIGRAM(S): at 05:35

## 2021-05-05 RX ADMIN — Medication 1 MILLIGRAM(S): at 12:05

## 2021-05-05 RX ADMIN — Medication 975 MILLIGRAM(S): at 05:36

## 2021-05-05 RX ADMIN — Medication 975 MILLIGRAM(S): at 12:05

## 2021-05-05 RX ADMIN — Medication 5 MILLIGRAM(S): at 12:08

## 2021-05-05 RX ADMIN — Medication 100 MILLIGRAM(S): at 12:04

## 2021-05-05 RX ADMIN — Medication 250 MICROGRAM(S): at 05:57

## 2021-05-05 RX ADMIN — Medication 975 MILLIGRAM(S): at 21:57

## 2021-05-05 RX ADMIN — Medication 1 TABLET(S): at 12:06

## 2021-05-05 RX ADMIN — Medication 5 MILLIGRAM(S): at 18:25

## 2021-05-05 RX ADMIN — Medication 1 TABLET(S): at 21:56

## 2021-05-05 RX ADMIN — Medication 500 MILLIGRAM(S): at 05:35

## 2021-05-05 RX ADMIN — Medication 100 MILLIGRAM(S): at 02:05

## 2021-05-05 RX ADMIN — Medication 1 TABLET(S): at 05:35

## 2021-05-05 RX ADMIN — CYCLOBENZAPRINE HYDROCHLORIDE 10 MILLIGRAM(S): 10 TABLET, FILM COATED ORAL at 05:35

## 2021-05-05 RX ADMIN — Medication 5 MILLIGRAM(S): at 05:34

## 2021-05-05 RX ADMIN — Medication 975 MILLIGRAM(S): at 06:00

## 2021-05-05 RX ADMIN — Medication 3 MILLIGRAM(S): at 21:57

## 2021-05-05 RX ADMIN — Medication 150 MILLIGRAM(S): at 18:25

## 2021-05-05 RX ADMIN — Medication 500 MILLIGRAM(S): at 18:25

## 2021-05-05 RX ADMIN — SODIUM CHLORIDE 1000 MILLILITER(S): 9 INJECTION, SOLUTION INTRAVENOUS at 08:57

## 2021-05-05 NOTE — CHART NOTE - NSCHARTNOTEFT_GEN_A_CORE
Measure fit and apply [place] custom fit LSO. Orthosis fit well. Reviewed application skin precautions and care. Written instructions and contact information given. To notify office with any issues questions or concerns.    Orthosis to be worn when out of bed to protect and stabilize spine reduce pain by reducing mobility of the trunk and facilitate healing following his surgical procedure.    Levi JACKSON  [facility]  [phone]

## 2021-05-05 NOTE — PHYSICAL THERAPY INITIAL EVALUATION ADULT - PERTINENT HX OF CURRENT PROBLEM, REHAB EVAL
Pt is a 74 y/o male admitted to [facility] on 5/4/21 h/o afib ,,isthmic spondylolisthesis and spinal stenosis with L5 radiculopathy who  failed conservative management. Pt is s/p Left L3-S1 michela lami, L L5-S1 TLIF and PSF on 5/4/21

## 2021-05-05 NOTE — PHYSICAL THERAPY INITIAL EVALUATION ADULT - BED MOBILITY TRAINING, PT EVAL
Petra Brand     1947       Ira Isabel MD, [facility]  Date of Visit-2/26/2021   PCP is Anu Nascimento MD   [facility] [place]  [facility]  HPI:  Harpreet Brown is a 76 y.o. male   F/u post cath. Hx of CABG in 2006. Prior TIA on Plavix. He saw Dr. Driss Landers until 2015, though the pt had told the surgeon he had not seen a cardiologist in 10 years. Our notes indicate 3 vessel CABG July 2005. Cath in August 2009 with open [address] to LAD, open SVG to PDA, but the PDA was closed at the insertion site, open SVG to OM1. Echo 12/28/18 EF 65-70%. Saw PCP in November with adjustment in thyroid. He had a lipoma resection in July. Pt underwent cath 1/20/21 all grafts were open and LVEDP was 16. Pt states he gets SOB and fatigue when walking up stairs. He notes his chest pain is improved. He reports having numbness in his fingers when he lays down. Denies chest pain, edema, syncope or shortness of breath at rest, has no tachycardia, palpitations or sense of arrhythmia. Sees Dr Driss Landers with   EKG: Sinus  Rhythm   -Left atrial enlargement.    -Old anterior infarct.    -Nonspecific ST depression  -Nondiagnostic.   01/20/21   CARDIAC PROCEDURE 01/22/2021 1/22/2021    Narrative 1. Native three vessel CAD. 2. Open grafts include SVG-RCA, SVG-OM, Free LIMA to LAD. 3. Normal LV systolic function, LVEDP 16.  4. Treat medical management. Signed by: Tamara Diaz MD      Assessment/Plan:     Patient Instructions   Stop atorvastatin for 1 week to see if hand numbness improves. 4 month follow up      Future Appointments   Date Time Provider Amy House   9/17/2021 10:20 AM Ira Burr MD C[facility]RE[facility] BS AMB        1. Coronary artery disease involving native coronary artery of native heart with unstable angina pectoris [facility])  Cath shows open grafts. He has more of SOLER than chest pain now. I think it is all relatively mild.  His EDP was 16 which isn't
that high so I am not sure that he needs any diuretic. Current medical therapy is appropriate. - AMB POC EKG ROUTINE W/ 12 LEADS, INTER & REP  Key CAD CHF Meds             lisinopriL (PRINIVIL, ZESTRIL) 5 mg tablet (Taking) Take 1 Tab by mouth daily. nitroglycerin (NITROSTAT) 0.4 mg SL tablet (Taking) 1 Tab by SubLINGual route every five (5) minutes as needed for Chest Pain. Do not exceed 3 in 24 hours. clopidogreL (PLAVIX) 75 mg tab (Taking) Take 1 Tab by mouth daily. atorvastatin (Lipitor) 20 mg tablet (Taking) Take 1 Tab by mouth nightly. omega-3 fatty acids-vitamin e (FISH OIL) 1,000 mg Cap (Taking) Take 1 Cap by mouth daily. 2. Mixed hyperlipidemia  At goal. Tolerating statin. Lipids on high potency statin as appropriate for secondary prevention. Has some arm numbness which might be a side effect. I will have him stop it for a week to see if it improves things. 3. Essential hypertension  Stable. Continue current meds including Lisinopril. BP Readings from Last 6 Encounters:   02/26/21 120/60   02/11/21 130/70   01/26/21 128/70   01/20/21 (!) 143/76   01/08/21 130/80   11/19/20 130/70       F/u in 4 months     Impression:   1. Coronary artery disease involving native coronary artery of native heart with unstable angina pectoris ([facility] [address]    2. Mixed hyperlipidemia    3. Essential hypertension       Cardiac History:   No specialty comments available. ROS-except as noted above. . A complete cardiac and respiratory are reviewed and negative except as above ; Resp-denies wheezing  or productive cough,.  Const- No unusual weight loss or fever; Neuro-no recent seizure or CVA ; GI- No BRBPR, abdom pain, bloating ; - no  hematuria   see supplement sheet, initialed and to be scanned by staff  Past Medical History:   Diagnosis Date    Acid indigestion     Arthritis 8/20/2012    Back pain     CAD (coronary artery disease) 2005    CABG X 3    Chronic pain     BACK    Diabetes ([facility] [address]
BORDERLINE NO MEDS CURRNTLY (7-15-20)    Enlarged prostate     GERD (gastroesophageal reflux disease) 8/20/2012    HTN (hypertension) 8/20/2012    NO MEDS CURRENTLY (7-15-20)    Hyperlipemia 8/20/2012    IH (inguinal hernia) 2/22/2015    2015, small right , reducible IH .  Ill-defined condition 12/2018    TIA, double vision    Insomnia     Stroke ([address] 12/2018    TIA - H/O DOUBLE VISION    Thyroid disorder 8/20/2012      Social Hx= reports that he quit smoking about 41 years ago. His smoking use included cigarettes. He has a 10.00 pack-year smoking history. He has never used smokeless tobacco. He reports previous alcohol use. He reports that he does not use drugs. Exam and Labs:  /60 (BP 1 Location: Left arm, BP Patient Position: Sitting, BP Cuff Size: Adult)   Pulse 95   Resp 16   Ht 5' 6\" (1.676 m)   Wt 184 lb (83.5 kg)   SpO2 97%   BMI 29.70 kg/m² Constitutional:  NAD, comfortable  Head: NC,AT. Eyes: No scleral icterus. Neck:  Neck supple. No JVD present. Throat: moist mucous membranes. Chest: Effort normal & normal respiratory excursion . Neurological: alert, conversant and oriented . Skin: Skin is not cold. No obvious systemic rash noted. Not diaphoretic. No erythema. Psychiatric:  Grossly normal mood and affect. Behavior appears normal. Extremities:  no clubbing or cyanosis. Abdomen: non distended    Lungs:breath sounds normal. No stridor. distress, wheezes or  Rales. Heart: normal rate, regular rhythm, normal S1, S2, no murmurs, rubs, clicks or gallops , PMI non displaced. Edema: Edema is none.   Lab Results   Component Value Date/Time    Cholesterol, total 145 12/28/2018 03:13 AM    HDL Cholesterol 35 12/28/2018 03:13 AM    LDL, calculated 66.2 12/28/2018 03:13 AM    Triglyceride 219 (H) 12/28/2018 03:13 AM    CHOL/HDL Ratio 4.1 12/28/2018 03:13 AM     Lab Results   Component Value Date/Time    Sodium 137 01/18/2021 08:29 AM    Potassium 4.3 01/18/2021 08:29 AM    Chloride 102
01/18/2021 08:29 AM    CO2 32 01/18/2021 08:29 AM    Anion gap 3 (L) 01/18/2021 08:29 AM    Glucose 139 (H) 01/18/2021 08:29 AM    BUN 13 01/18/2021 08:29 AM    Creatinine 0.83 01/18/2021 08:29 AM    BUN/Creatinine ratio 16 01/18/2021 08:29 AM    GFR est AA >60 01/18/2021 08:29 AM    GFR est non-AA >60 01/18/2021 08:29 AM    Calcium 8.9 01/18/2021 08:29 AM      Wt Readings from Last 3 Encounters:   02/26/21 184 lb (83.5 kg)   02/11/21 188 lb (85.3 kg)   01/26/21 185 lb (83.9 kg)      BP Readings from Last 3 Encounters:   02/26/21 120/60   02/11/21 130/70   01/26/21 128/70      Current Outpatient Medications   Medication Sig    lisinopriL (PRINIVIL, ZESTRIL) 5 mg tablet Take 1 Tab by mouth daily.  nitroglycerin (NITROSTAT) 0.4 mg SL tablet 1 Tab by SubLINGual route every five (5) minutes as needed for Chest Pain. Do not exceed 3 in 24 hours.  tamsulosin (Flomax) 0.4 mg capsule Take 2 Caps by mouth daily.  pantoprazole (PROTONIX) 40 mg tablet Take 1 Tab by mouth daily.  clopidogreL (PLAVIX) 75 mg tab Take 1 Tab by mouth daily.  atorvastatin (Lipitor) 20 mg tablet Take 1 Tab by mouth nightly. (Patient taking differently: Take 20 mg by mouth daily.)    acetaminophen (TYLENOL) 325 mg tablet Take 2 Tabs by mouth every four (4) hours as needed for Pain or Fever (For temp greater than or equal to 38.5 C or 101.3 F (Unless hepatic failure or contrindicated). Give first line for fever. ).  magnesium oxide (MAG-OX) 400 mg tablet Take 400 mg by mouth daily.  multivitamin (ONE A DAY) tablet Take 1 Tab by mouth daily.  omega-3 fatty acids-vitamin e (FISH OIL) 1,000 mg Cap Take 1 Cap by mouth daily.  levothyroxine (SYNTHROID) 125 mcg tablet Take 1 Tab by mouth Daily (before breakfast). No current facility-administered medications for this visit. Impression see above.       Written by Sandra Renteria, as dictated by Hossein Carias MD.
GOAL: Pt will perform bed mobility with independence in 2 weeks.
home w/ home PT

## 2021-05-05 NOTE — PHYSICAL THERAPY INITIAL EVALUATION ADULT - ADDITIONAL COMMENTS
Pt lives in a private house with spouse with 3 steps to enter and one one flight of steps to the basement but all needs met on main level. Pt was Ind with all ADLs and amb with SC

## 2021-05-05 NOTE — PHYSICAL THERAPY INITIAL EVALUATION ADULT - PLANNED THERAPY INTERVENTIONS, PT EVAL
stair negotiation:  GOAL: Pt will be able to negotiate 10 steps +HR independently with reciprocal pattern in 2 weeks./bed mobility training/gait training/transfer training

## 2021-05-06 ENCOUNTER — TRANSCRIPTION ENCOUNTER (OUTPATIENT)
Age: 76
End: 2021-05-06

## 2021-05-06 VITALS
DIASTOLIC BLOOD PRESSURE: 79 MMHG | HEART RATE: 86 BPM | SYSTOLIC BLOOD PRESSURE: 122 MMHG | TEMPERATURE: 98 F | RESPIRATION RATE: 17 BRPM | OXYGEN SATURATION: 96 %

## 2021-05-06 LAB
ANION GAP SERPL CALC-SCNC: 12 MMOL/L — SIGNIFICANT CHANGE UP (ref 5–17)
BASOPHILS # BLD AUTO: 0.01 K/UL — SIGNIFICANT CHANGE UP (ref 0–0.2)
BASOPHILS NFR BLD AUTO: 0.1 % — SIGNIFICANT CHANGE UP (ref 0–2)
BUN SERPL-MCNC: 20 MG/DL — SIGNIFICANT CHANGE UP (ref 7–23)
CALCIUM SERPL-MCNC: 9.3 MG/DL — SIGNIFICANT CHANGE UP (ref 8.4–10.5)
CHLORIDE SERPL-SCNC: 101 MMOL/L — SIGNIFICANT CHANGE UP (ref 96–108)
CO2 SERPL-SCNC: 26 MMOL/L — SIGNIFICANT CHANGE UP (ref 22–31)
CREAT SERPL-MCNC: 0.74 MG/DL — SIGNIFICANT CHANGE UP (ref 0.5–1.3)
EOSINOPHIL # BLD AUTO: 0 K/UL — SIGNIFICANT CHANGE UP (ref 0–0.5)
EOSINOPHIL NFR BLD AUTO: 0 % — SIGNIFICANT CHANGE UP (ref 0–6)
GLUCOSE SERPL-MCNC: 159 MG/DL — HIGH (ref 70–99)
HCT VFR BLD CALC: 35.1 % — LOW (ref 39–50)
HGB BLD-MCNC: 12.2 G/DL — LOW (ref 13–17)
IMM GRANULOCYTES NFR BLD AUTO: 0.5 % — SIGNIFICANT CHANGE UP (ref 0–1.5)
LYMPHOCYTES # BLD AUTO: 0.63 K/UL — LOW (ref 1–3.3)
LYMPHOCYTES # BLD AUTO: 4.9 % — LOW (ref 13–44)
MCHC RBC-ENTMCNC: 34.8 GM/DL — SIGNIFICANT CHANGE UP (ref 32–36)
MCHC RBC-ENTMCNC: 36.9 PG — HIGH (ref 27–34)
MCV RBC AUTO: 106 FL — HIGH (ref 80–100)
MONOCYTES # BLD AUTO: 0.65 K/UL — SIGNIFICANT CHANGE UP (ref 0–0.9)
MONOCYTES NFR BLD AUTO: 5 % — SIGNIFICANT CHANGE UP (ref 2–14)
NEUTROPHILS # BLD AUTO: 11.59 K/UL — HIGH (ref 1.8–7.4)
NEUTROPHILS NFR BLD AUTO: 89.5 % — HIGH (ref 43–77)
NRBC # BLD: 0 /100 WBCS — SIGNIFICANT CHANGE UP (ref 0–0)
PLATELET # BLD AUTO: 165 K/UL — SIGNIFICANT CHANGE UP (ref 150–400)
POTASSIUM SERPL-MCNC: 4.3 MMOL/L — SIGNIFICANT CHANGE UP (ref 3.5–5.3)
POTASSIUM SERPL-SCNC: 4.3 MMOL/L — SIGNIFICANT CHANGE UP (ref 3.5–5.3)
RBC # BLD: 3.31 M/UL — LOW (ref 4.2–5.8)
RBC # FLD: 11.9 % — SIGNIFICANT CHANGE UP (ref 10.3–14.5)
SODIUM SERPL-SCNC: 139 MMOL/L — SIGNIFICANT CHANGE UP (ref 135–145)
WBC # BLD: 12.95 K/UL — HIGH (ref 3.8–10.5)
WBC # FLD AUTO: 12.95 K/UL — HIGH (ref 3.8–10.5)

## 2021-05-06 PROCEDURE — 85014 HEMATOCRIT: CPT

## 2021-05-06 PROCEDURE — 97530 THERAPEUTIC ACTIVITIES: CPT

## 2021-05-06 PROCEDURE — 82330 ASSAY OF CALCIUM: CPT

## 2021-05-06 PROCEDURE — U0005: CPT

## 2021-05-06 PROCEDURE — 97116 GAIT TRAINING THERAPY: CPT

## 2021-05-06 PROCEDURE — 85018 HEMOGLOBIN: CPT

## 2021-05-06 PROCEDURE — 76000 FLUOROSCOPY <1 HR PHYS/QHP: CPT

## 2021-05-06 PROCEDURE — S2900: CPT

## 2021-05-06 PROCEDURE — 71250 CT THORAX DX C-: CPT

## 2021-05-06 PROCEDURE — 84295 ASSAY OF SERUM SODIUM: CPT

## 2021-05-06 PROCEDURE — C1769: CPT

## 2021-05-06 PROCEDURE — 88304 TISSUE EXAM BY PATHOLOGIST: CPT

## 2021-05-06 PROCEDURE — 72131 CT LUMBAR SPINE W/O DYE: CPT

## 2021-05-06 PROCEDURE — 82803 BLOOD GASES ANY COMBINATION: CPT

## 2021-05-06 PROCEDURE — U0003: CPT

## 2021-05-06 PROCEDURE — C9803: CPT

## 2021-05-06 PROCEDURE — 82435 ASSAY OF BLOOD CHLORIDE: CPT

## 2021-05-06 PROCEDURE — 82565 ASSAY OF CREATININE: CPT

## 2021-05-06 PROCEDURE — C1889: CPT

## 2021-05-06 PROCEDURE — C1713: CPT

## 2021-05-06 PROCEDURE — 82947 ASSAY GLUCOSE BLOOD QUANT: CPT

## 2021-05-06 PROCEDURE — 80048 BASIC METABOLIC PNL TOTAL CA: CPT

## 2021-05-06 PROCEDURE — 97161 PT EVAL LOW COMPLEX 20 MIN: CPT

## 2021-05-06 PROCEDURE — 83605 ASSAY OF LACTIC ACID: CPT

## 2021-05-06 PROCEDURE — 85025 COMPLETE CBC W/AUTO DIFF WBC: CPT

## 2021-05-06 PROCEDURE — 84132 ASSAY OF SERUM POTASSIUM: CPT

## 2021-05-06 PROCEDURE — 86769 SARS-COV-2 COVID-19 ANTIBODY: CPT

## 2021-05-06 RX ORDER — OXYCODONE HYDROCHLORIDE 5 MG/1
1 TABLET ORAL
Qty: 45 | Refills: 0
Start: 2021-05-06

## 2021-05-06 RX ORDER — DEXAMETHASONE 0.5 MG/5ML
1 ELIXIR ORAL
Qty: 7 | Refills: 0
Start: 2021-05-06

## 2021-05-06 RX ORDER — CYCLOBENZAPRINE HYDROCHLORIDE 10 MG/1
1 TABLET, FILM COATED ORAL
Qty: 21 | Refills: 0
Start: 2021-05-06

## 2021-05-06 RX ORDER — SENNA PLUS 8.6 MG/1
2 TABLET ORAL
Qty: 0 | Refills: 0 | DISCHARGE
Start: 2021-05-06

## 2021-05-06 RX ORDER — ACETAMINOPHEN 500 MG
3 TABLET ORAL
Qty: 0 | Refills: 0 | DISCHARGE
Start: 2021-05-06

## 2021-05-06 RX ADMIN — Medication 975 MILLIGRAM(S): at 05:39

## 2021-05-06 RX ADMIN — Medication 150 MILLIGRAM(S): at 05:39

## 2021-05-06 RX ADMIN — Medication 81 MILLIGRAM(S): at 11:49

## 2021-05-06 RX ADMIN — Medication 3 MILLIGRAM(S): at 05:38

## 2021-05-06 RX ADMIN — Medication 1 TABLET(S): at 05:39

## 2021-05-06 RX ADMIN — Medication 250 MICROGRAM(S): at 05:38

## 2021-05-06 RX ADMIN — Medication 500 MILLIGRAM(S): at 05:39

## 2021-05-06 RX ADMIN — Medication 1 MILLIGRAM(S): at 11:49

## 2021-05-06 RX ADMIN — Medication 975 MILLIGRAM(S): at 05:40

## 2021-05-06 RX ADMIN — Medication 1 TABLET(S): at 11:49

## 2021-05-06 RX ADMIN — Medication 3 MILLIGRAM(S): at 11:49

## 2021-05-06 NOTE — PROGRESS NOTE ADULT - PROBLEM SELECTOR PLAN 1
PT/OT-WBAT, brace for support  IS  DVT PPx  Pain Control  Continue Current Tx.  d/c planning    Mane Hood PA-C  Team Pager: #[phone]

## 2021-05-06 NOTE — DISCHARGE NOTE NURSING/CASE MANAGEMENT/SOCIAL WORK - PATIENT PORTAL LINK FT
You can access the FollowMyHealth Patient Portal offered by Health system by registering at the following website: [email or web address]. By joining [employer]’s FollowMyHealth portal, you will also be able to view your health information using other applications (apps) compatible with our system.

## 2021-05-06 NOTE — DISCHARGE NOTE PROVIDER - HOSPITAL COURSE
Reason for Admission	" I am having a back surgery"  Goal:  Symptom relief     History of Present Illness:  History of Present Illness	  76 y/o male smoker h/o afib, isthmic spondylolisthesis and spinal stenosis with L5 radiculopathy who  failed conservative management is scheduled for L3-S1 Posterior Lumbar Laminectomy L5-S1 Instrumented spinal fusion on 05/04/2021.  Denies Recent travel, Exposure or Covid symptoms  Covid PCR Test on 05/01/2021    PAST MEDICAL HISTORY:  Afib On Aspirin, metoprolol, digoxin  Spinal stenosis.     PAST SURGICAL HISTORY:  No significant past surgical history.    This is a 75 year old Male admitted to [facility] on 5/4/21 for an elective spine surgery.  Surgery was uncomplicated.  Patient evaluated and treated by PT, recommended for Home.  Remain of hospital stay unremarkable, and patient discharged home when PT cleared.     < from: CT Chest No Cont (05.05.21 @ 11:24) >    IMPRESSION:    1.  Right lung nodule seen on recent CT lumbar spine on 5/4/2021 is due to mucoid impacted bronchi.  2.  Patchy ill-defined consolidations and groundglass opacities in the right upper lung, likely etiologies include infection versus inflammatory causes. Recommend follow-up CT scan in 1-3 months for comparison.  3.  Pulmonary nodule in the right upper lobe measuring 3 mm, may also be evaluated in follow-up CT scan as above.    < end of copied text >

## 2021-05-06 NOTE — DISCHARGE NOTE PROVIDER - CARE PROVIDER_API CALL
Dalton Cárdenas)  Orthopedics  [address]  [place] [address]  Phone: [phone]  Fax: [phone]  Follow Up Time:

## 2021-05-06 NOTE — DISCHARGE NOTE PROVIDER - NSDCFUADDINST_GEN_ALL_CORE_FT
Please follow up with your doctor 14 days after your discharge from the hospital (call for appointment).  PT-weight bearing as tolerated with brace for support.  Keep dressing clean and intact, have doctor remove staples/sutures post op day 14 (if applicable) and apply steristrips.  Please follow up with your PMD within 1 month for routine checkup.  You will require a repeat CT scan of chest for 1-3 months.

## 2021-05-06 NOTE — DISCHARGE NOTE PROVIDER - CARE PROVIDERS DIRECT ADDRESSES
[email or web address]

## 2021-05-06 NOTE — DISCHARGE NOTE PROVIDER - NSDCMRMEDTOKEN_GEN_ALL_CORE_FT
aspirin 81 mg oral tablet: 1  orally once a day  digoxin 250 mcg (0.25 mg) oral tablet: 1  orally once a day (at bedtime)  LSO Brace: Dx: L3-S1 Kodak/Lami, L5-S1 PSF/TLIF  TORO: 99 months  metoprolol tartrate 100 mg oral tablet: 1.5  orally 2 times a day   acetaminophen 325 mg oral tablet: 3 tab(s) orally every 8 hours, As Needed, mild pain, temp &gt;100.4F  aspirin 81 mg oral tablet: 1  orally once a day  cyclobenzaprine 10 mg oral tablet: 1 tab(s) orally every 8 hours, As needed, Muscle Spasm MDD:3  dexamethasone 1 mg oral tablet: Starting at 4pm on 5/6, take 3 tabs oral x1, then 6 hours later take 1 tab(s) orally every 6 hours x 4 doses then stop.  digoxin 250 mcg (0.25 mg) oral tablet: 1  orally once a day (at bedtime)  LSO Brace: Dx: L3-S1 Kodak/Lami, L5-S1 PSF/TLIF  TORO: 99 months  metoprolol tartrate 100 mg oral tablet: 1.5  orally 2 times a day  oxyCODONE 5 mg oral tablet: 1-2 tab(s) orally every 4-6 hours, As needed, Moderate to severe pain. MDD:8  senna oral tablet: 2 tab(s) orally once a day (at bedtime)

## 2021-05-06 NOTE — PROGRESS NOTE ADULT - SUBJECTIVE AND OBJECTIVE BOX
Pt seen and examined at bedside, sitting comfortably in bed.  No acute events overnight. Pain well controlled, states his left leg feels better and sensory deficits at the sole of foot is improved. Happy with outcome of surgery.  No Chest Pain, SOB, N/V.        Vital Signs Last 24 Hrs  T(C): 36.6 (05 May 2021 04:48), Max: 36.7 (05 May 2021 01:45)  T(F): 97.8 (05 May 2021 04:48), Max: 98.1 (05 May 2021 01:45)  HR: 91 (05 May 2021 05:40) (76 - 107)  BP: 123/78 (05 May 2021 05:40) (86/55 - 123/78)  BP(mean): 78 (04 May 2021 23:00) (67 - 85)  RR: 16 (05 May 2021 04:48) (15 - 20)  SpO2: 98% (05 May 2021 04:48) (95% - 100%)      Exam:   Alert/Oriented, No Acute Distress             Dressing: [x] clean/dry/intact          Sensation: SILT L2-S1, decreased sensation to sole of foot but improving         Motor exam:                   Upper extremity                Bi          Tri        Delt                                                    R         5/5        5/5        5/5                                               L          5/5        5/5        5/5                     Lower extremity                    PF          DF         EHL       FHL                                                                                            R        5/5        5/5        5/5       5/5                                                        L         5/5       5/5        5/5       5/5                                                                  Calves Soft/Non-tender bilaterally           Toes warm well perfused; capillary refill <3 seconds                        A/P :  Patient is a 75y Male s/p L3-S1 Kodak/lami, L5-S1 PSF/TLIF POD 1    -    Pain control  -    Taper  -    Resume A81 daily  -    For TOV in AM with PT- DC perkins today  -    FU CT Chest (incidental finding of right lung nodule on CXR)  -    DVT ppx: SCDs       -    Physical Therapy  -    Weight bearing status: WBAT with LSO brace for ambulation; patient may be OOB with PT prior to brace arrival  -    FU AM Labs  -    Dispo planning, likely home in 1-2 days
Post op Day [2 ]    Patient resting without complaints.  No chest pain, SOB, N/V.    T(C): 36.3 (05-06-21 @ 05:06), Max: 37 (05-05-21 @ 18:00)  HR: 75 (05-06-21 @ 05:06) (75 - 93)  BP: 127/76 (05-06-21 @ 05:06) (110/74 - 127/76)  RR: 16 (05-06-21 @ 05:06) (14 - 18)  SpO2: 97% (05-06-21 @ 05:06) (94% - 99%)  Wt(kg): --    Exam:  Alert and Oriented, No Acute Distress  Back dsg c/d/i  Calves Soft, Non-tender bilaterally  +PF/DF/EHL/FHL 5/5 bilat  Chronic parasthesias LLE improved but still persisting somewhat but otherwise SILT LLE, RLE SILT                          12.7   7.72  )-----------( 152      ( 05 May 2021 07:27 )             36.8    05-05    137  |  99  |  17  ----------------------------<  167<H>  4.8   |  24  |  0.75    Ca    8.4      05 May 2021 07:26

## 2021-05-06 NOTE — DISCHARGE NOTE PROVIDER - NSDCCPCAREPLAN_GEN_ALL_CORE_FT
PRINCIPAL DISCHARGE DIAGNOSIS  Diagnosis: Spinal stenosis of lumbosacral region  Assessment and Plan of Treatment:       SECONDARY DISCHARGE DIAGNOSES  Diagnosis: Spinal stenosis of lumbosacral region  Assessment and Plan of Treatment:

## 2021-05-07 LAB — SURGICAL PATHOLOGY STUDY: SIGNIFICANT CHANGE UP

## 2021-05-13 ENCOUNTER — APPOINTMENT (OUTPATIENT)
Dept: ORTHOPEDIC SURGERY | Facility: CLINIC | Age: 76
End: 2021-05-13

## 2021-05-13 DIAGNOSIS — M48.07 SPINAL STENOSIS, LUMBOSACRAL REGION: ICD-10-CM

## 2021-05-13 RX ORDER — OXYCODONE 5 MG/1
5 TABLET ORAL EVERY 6 HOURS
Qty: 120 | Refills: 0 | Status: ACTIVE | COMMUNITY
Start: 2021-05-13 | End: 1900-01-01

## 2021-05-20 ENCOUNTER — APPOINTMENT (OUTPATIENT)
Dept: SURGERY | Facility: CLINIC | Age: 76
End: 2021-05-20

## 2021-06-07 ENCOUNTER — APPOINTMENT (OUTPATIENT)
Dept: ORTHOPEDIC SURGERY | Facility: CLINIC | Age: 76
End: 2021-06-07
Payer: MEDICARE

## 2021-06-07 VITALS — WEIGHT: 175 LBS | HEIGHT: 72 IN | BODY MASS INDEX: 23.7 KG/M2

## 2021-06-07 PROCEDURE — 99024 POSTOP FOLLOW-UP VISIT: CPT

## 2021-07-12 ENCOUNTER — APPOINTMENT (OUTPATIENT)
Dept: ORTHOPEDIC SURGERY | Facility: CLINIC | Age: 76
End: 2021-07-12
Payer: MEDICARE

## 2021-07-12 PROCEDURE — 99024 POSTOP FOLLOW-UP VISIT: CPT

## 2021-07-12 PROCEDURE — 72100 X-RAY EXAM L-S SPINE 2/3 VWS: CPT

## 2021-07-12 RX ORDER — OXYCODONE 5 MG/1
5 TABLET ORAL EVERY 8 HOURS
Qty: 90 | Refills: 0 | Status: ACTIVE | COMMUNITY
Start: 2021-07-12 | End: 1900-01-01

## 2021-08-16 ENCOUNTER — APPOINTMENT (OUTPATIENT)
Dept: ORTHOPEDIC SURGERY | Facility: CLINIC | Age: 76
End: 2021-08-16
Payer: MEDICARE

## 2021-08-16 PROCEDURE — 99214 OFFICE O/P EST MOD 30 MIN: CPT

## 2021-08-16 PROCEDURE — 72100 X-RAY EXAM L-S SPINE 2/3 VWS: CPT

## 2021-08-16 RX ORDER — OXYCODONE 5 MG/1
5 TABLET ORAL
Qty: 60 | Refills: 0 | Status: ACTIVE | COMMUNITY
Start: 2021-08-16 | End: 1900-01-01

## 2021-09-20 ENCOUNTER — APPOINTMENT (OUTPATIENT)
Dept: ORTHOPEDIC SURGERY | Facility: CLINIC | Age: 76
End: 2021-09-20
Payer: MEDICARE

## 2021-09-20 VITALS — HEIGHT: 72 IN | BODY MASS INDEX: 23.16 KG/M2 | WEIGHT: 171 LBS

## 2021-09-20 PROCEDURE — 99214 OFFICE O/P EST MOD 30 MIN: CPT

## 2021-09-20 PROCEDURE — 72100 X-RAY EXAM L-S SPINE 2/3 VWS: CPT

## 2021-09-20 RX ORDER — OXYCODONE 5 MG/1
5 TABLET ORAL
Qty: 30 | Refills: 0 | Status: ACTIVE | COMMUNITY
Start: 2021-09-20 | End: 1900-01-01

## 2021-11-01 ENCOUNTER — APPOINTMENT (OUTPATIENT)
Dept: ORTHOPEDIC SURGERY | Facility: CLINIC | Age: 76
End: 2021-11-01
Payer: MEDICARE

## 2021-11-01 PROCEDURE — 72100 X-RAY EXAM L-S SPINE 2/3 VWS: CPT

## 2021-11-01 PROCEDURE — 99214 OFFICE O/P EST MOD 30 MIN: CPT

## 2021-11-10 NOTE — DISCHARGE NOTE NURSING/CASE MANAGEMENT/SOCIAL WORK - NSDCVIVACCINE_GEN_ALL_CORE_FT
SICU Daily Progress Note  =====================================================  Interval/Overnight Events:       ***    HPI:  55 y/o female with Type 2 DM, HTN, HLD, Mild ANGLE (not on CPAP), Morbid Obesity, Fatty liver, depression and anxiety presents to PST preop for total thyroidectomy. pt diagnosed with thyroid nodule 2 years ago. pt had an ultrasound of the neck which showed two right lobe nodules.  s/p biopsy on 21. pt states one nodule showed atypical cells and "90% risk of cancer".       PMH:  PAST MEDICAL & SURGICAL HISTORY:  Morbid obesity    HTN (hypertension)    HLD (hyperlipidemia)    Type 2 diabetes mellitus    Anxiety and depression    Fatty liver    Bilateral carpal tunnel syndrome    OA (osteoarthritis)    Bulging lumbar disc    Right tennis elbow  and left elbow    Left shoulder pain    Thyroid nodule    Malignant neoplasm of thyroid gland    ANGLE (obstructive sleep apnea)  mild ANGLE    H/O  section   and     History of laparoscopic adjustable gastric banding   and removed in         ALLERGIES:  No Known Allergies      --------------------------------------------------------------------------------------    MEDICATIONS:    Neurologic Medications  acetaminophen     Tablet .. 650 milliGRAM(s) Oral every 6 hours PRN Mild Pain (1 - 3)  escitalopram 20 milliGRAM(s) Oral daily  oxyCODONE    IR 5 milliGRAM(s) Oral every 6 hours PRN Moderate Pain (4 - 6)  oxyCODONE    IR 10 milliGRAM(s) Oral every 6 hours PRN Severe Pain (7 - 10)    Respiratory Medications    Cardiovascular Medications  losartan 100 milliGRAM(s) Oral daily    Gastrointestinal Medications  dextrose 5%. 1000 milliLiter(s) IV Continuous <Continuous>  dextrose 5%. 1000 milliLiter(s) IV Continuous <Continuous>  famotidine Injectable 20 milliGRAM(s) IV Push once PRN nausea/vomiting  sodium chloride 0.9% lock flush 3 milliLiter(s) IV Push every 8 hours    Genitourinary Medications    Hematologic/Oncologic Medications  influenza   Vaccine 0.5 milliLiter(s) IntraMuscular once    Antimicrobial/Immunologic Medications    Endocrine/Metabolic Medications  dextrose 40% Gel 15 Gram(s) Oral once  dextrose 50% Injectable 50 milliLiter(s) IV Push every 15 minutes  dextrose 50% Injectable 25 milliLiter(s) IV Push every 15 minutes  glucagon  Injectable 1 milliGRAM(s) IntraMuscular once  insulin glargine Injectable (LANTUS) 75 Unit(s) SubCutaneous at bedtime  insulin lispro (ADMELOG) corrective regimen sliding scale   SubCutaneous three times a day before meals  insulin lispro (ADMELOG) corrective regimen sliding scale   SubCutaneous at bedtime  levothyroxine 200 MICROGram(s) Oral daily  levothyroxine 25 MICROGram(s) Oral daily    Topical/Other Medications    --------------------------------------------------------------------------------------    VITAL SIGNS:  ICU Vital Signs Last 24 Hrs  T(C): 37 (2021 20:00), Max: 37.3 (2021 16:00)  T(F): 98.6 (2021 20:00), Max: 99.1 (2021 16:00)  HR: 78 (2021 20:00) (64 - 80)  BP: 139/60 (2021 16:00) (139/60 - 140/71)  BP(mean): 79 (2021 16:00) (79 - 79)  ABP: 138/71 (2021 12:00) (138/71 - 160/78)  ABP(mean): 98 (2021 12:00) (97 - 108)  RR: 26 (2021 20:00) (14 - 26)  SpO2: 99% (2021 20:00) (95% - 99%)    --------------------------------------------------------------------------------------    INS AND OUTS:  I&O's Summary    2021 07:01  -  2021 07:00  --------------------------------------------------------  IN: 892 mL / OUT: 2000 mL / NET: -1108 mL    2021 07:01  -  10 2021 03:20  --------------------------------------------------------  IN: 0 mL / OUT: 850 mL / NET: -850 mL      --------------------------------------------------------------------------------------      EXAM  General/Neuro  RASS:   GCS:   Exam:   -awake and alert, able to answer questions appropriately   -incision site c/d/i from thyroidectomy    Respiratory  Exam: CTA  h/o ANGLE  [] Tracheostomy   [] Intubated  Mechanical Ventilation:     Cardiovascular  Exam: RRR  Cardiac Rhythm: Normal Sinus Rhythm  ECHO:     GI  Exam: Abdomen, soft, NT, ND  Current Diet:  Consistent Carb Diet    Extremities  Exam: Extremities warm, pink, well-perfused.        Derm:  Exam: Good skin turgor, no skin breakdown.      METABOLIC / FLUIDS / ELECTROLYTES  dextrose 5%. 1000 milliLiter(s) IV Continuous <Continuous>  dextrose 5%. 1000 milliLiter(s) IV Continuous <Continuous>  sodium chloride 0.9% lock flush 3 milliLiter(s) IV Push every 8 hours      HEMATOLOGIC  [x] VTE Prophylaxis:   Transfusions:	[] PRBC	[] Platelets		[] FFP	[] Cryoprecipitate    INFECTIOUS DISEASE  Antimicrobials/Immunologic Medications:  influenza   Vaccine 0.5 milliLiter(s) IntraMuscular once      TUBES / LINES / DRAINS***  [x] Peripheral IV  [] Central Venous Line     	[] R	[] L	[] IJ	[] Fem	[] SC	Date Placed:   [] Arterial Line		[] R	[] L	[] Fem	[] Rad	[] Ax	Date Placed:   [] PICC		[] Midline		[] Mediport  [] Urinary Catheter		Date Placed:   [x] Necessity of urinary, arterial, and venous catheters discussed    --------------------------------------------------------------------------------------    LABS          139  |  101  |  13  ----------------------------<  149<H>  4.3   |  26  |  0.73    Ca    8.8      2021 03:17  Phos  4.3       Mg     2.00             ABG - ( 2021 13:54 )  pH: 7.32  /  pCO2: 53    /  pO2: 71    / HCO3: 27    / Base Excess: 0.3   /  SaO2: 94.0  / Lactate: x          RECENT CULTURES:      --------------------------------------------------------------------------------------    OTHER LABORATORY:     IMAGING STUDIES:    No Vaccines Administered.

## 2022-01-03 ENCOUNTER — APPOINTMENT (OUTPATIENT)
Dept: ORTHOPEDIC SURGERY | Facility: CLINIC | Age: 77
End: 2022-01-03
Payer: MEDICARE

## 2022-01-03 VITALS — HEIGHT: 72 IN | BODY MASS INDEX: 23.16 KG/M2 | WEIGHT: 171 LBS

## 2022-01-03 PROCEDURE — 99214 OFFICE O/P EST MOD 30 MIN: CPT

## 2022-01-03 PROCEDURE — 72100 X-RAY EXAM L-S SPINE 2/3 VWS: CPT

## 2022-01-03 RX ORDER — MELOXICAM 7.5 MG/1
7.5 TABLET ORAL DAILY
Qty: 30 | Refills: 3 | Status: ACTIVE | COMMUNITY
Start: 2021-11-01 | End: 1900-01-01

## 2022-01-05 DIAGNOSIS — Z00.00 ENCOUNTER FOR GENERAL ADULT MEDICAL EXAMINATION W/OUT ABNORMAL FINDINGS: ICD-10-CM

## 2022-01-10 ENCOUNTER — APPOINTMENT (OUTPATIENT)
Dept: ORTHOPEDIC SURGERY | Facility: CLINIC | Age: 77
End: 2022-01-10
Payer: MEDICARE

## 2022-01-10 VITALS — BODY MASS INDEX: 23.16 KG/M2 | WEIGHT: 171 LBS | HEIGHT: 72 IN

## 2022-01-10 PROCEDURE — 99215 OFFICE O/P EST HI 40 MIN: CPT

## 2022-01-10 PROCEDURE — 73502 X-RAY EXAM HIP UNI 2-3 VIEWS: CPT

## 2022-01-10 NOTE — HISTORY OF PRESENT ILLNESS
[de-identified] : This is very nice 76-year-old gentleman experiencing left hip and groin and thigh pain, which is severe in intensity. The pain substantially limits activities of daily living. Walking tolerance is reduced. Medication including NSAIDs and activity modification have been minimally effective for a period lasting greater than three months in duration. Assistive devices and external support were not deemed by the patient to be helpful in improving their function. Due to the severity of osteoarthritis and level of pain, physical therapy is contraindicated. Pain and restriction of function are intolerable at this time. The patient denies any radiation of the pain to the feet and it is not associated with numbness, tingling, or weakness.  History of atrial fibrillation on digoxin and metoprolol.  Not on any blood thinner.  History of nicotine abuse.

## 2022-01-10 NOTE — PHYSICAL EXAM
[de-identified] : Patient is well nourished, well-developed, in no acute distress, with appropriate mood and affect. The patient is oriented to time, place, and person. Respirations are even and unlabored. Gait evaluation reveals a limp. There is no inguinal adenopathy. Examination of the contralateral hip shows normal range of motion, strength, no tenderness, and intact skin. The affected limb is well-perfused, shows a grossly normal motor and sensory examination. Examination of the hip shows no skin lesions. Hip motion is reduced and causes pain. FADIR is positive and GABRIELLA is positive. Stinchfield test is positive. Leg lengths are approximately 1 cm short on the left. Both hips are stable and muscle strength is normal. Pedal pulses are palpable. [de-identified] : AP and lateral x-rays of the left hip, pelvis, and femur were ordered and taken in the office and demonstrate degenerative joint disease of the hip with joint space narrowing, osteophyte formation, and subchondral sclerosis.\par

## 2022-01-10 NOTE — DISCUSSION/SUMMARY
[de-identified] : This patient is severe left hip osteoarthritis.  He has failed a course of conservative management and would like to proceed with a direct anterior approach left total arthroplasty.\par \par The patient is an appropriate candidate for consideration of left total hip replacement. An extensive discussion was conducted of the natural history of the disease and the variety of surgical and non-surgical treatment options available to the patient. A risk/benefit analysis was discussed with the patient reviewing the advantages and disadvantages of surgical intervention at this time. Both the level and length of the patient's pain have made additional conservative treatment measures consisting of NSAIDs, physical therapy, and/or corticosteroids contraindicated. A full explanation was given of the nature and the purpose of the procedure and anesthesia, its benefits, possible alternative methods of diagnosis or treatment, the risks involved, the possibility of complications, the foreseeable consequences of the procedure and the possible results of the non-treatment. I reviewed the plan of care as well as used a model of a total hip implant equivalent to the one that will be used for their total hip joint replacement. The ability to secure the implant utilizing cement or cementless (press-fit) was discussed with the patient. The patient agrees with the plan of care, as well as the use of implants for their total hip replacement. \par \par No guarantee or assurance was made as to the results that may be obtained. Specifically, the risks were identified to include, but are not limited to the following: Infection, phlebitis, pulmonary embolism, death, paralysis, dislocation, pain, stiffness, instability, limp, weakness, breakage, leg-length inequality, uncontrolled bleeding, nerve injury, blood vessel injury, pressure sores, anesthetic risks, delayed healing of wound and bone, and wear and loosening. Further discussion was undertaken with the patient about the details of surgical preparation, treatment, and postoperative rehabilitation including medical clearance, autotransfusion, the hospital course, and the postoperative rehabilitation involved. All in all, I feel that this patient is a good candidate for surgical reconstruction.\par \par The patient and I discussed the current SARS-CoV-2 (COVID-19) pandemic which has affected our local hospitals. We discussed that our hospitals treat patients with COVID-19. All efforts will be made to avoid cohorting the patient with diagnosed or suspected COVID-19 patient. They also understand that we will screen them 24-48 hours prior to surgery. Despite our best efforts, there is a potential risk for iatrogenic transmission of COVID-19 to the patient during the perioperative period. [place] COVID-19 during the perioperative period may increase the patient´s risks of an adverse outcome including postoperative pneumonia, difficulty breathing, requirement for a breathing tube (general endotracheal intubation), and death. The patient is understanding of this risk, and is willing to proceed with surgery at this time.\par \par This patient is a smoker or has chronic use of nicotine products and understands our nicotine cessation policy and will be required to stop smoke 1 month before surgery and this will be continued through 1 month minimum after surgery. This includes all cigarettes, cigars, chewing tobacco, patches and gums which contain nicotine. I explained to the patient the risk of nicotine exposure, which is well documented in the orthopedic literature as increasing risks of wound breakdown (dehiscence), periprosthetic joint infection, dissatisfaction, chronic pain, and impaired overall outcome to the patient. The patient may be tested for nicotine in addition prior to undergoing joint arthroplasty. We discussed the options of quitting immediately, as well as the risk of tapering off with or without the use of medications or counseling. The patient is in agreement with this plan.\par

## 2022-01-10 NOTE — REVIEW OF SYSTEMS
[Arthralgia] : arthralgia [Joint Pain] : joint pain [Joint Stiffness] : joint stiffness [Negative] : Heme/Lymph No

## 2022-02-16 ENCOUNTER — OUTPATIENT (OUTPATIENT)
Dept: OUTPATIENT SERVICES | Facility: HOSPITAL | Age: 77
LOS: 1 days | Discharge: HOME | End: 2022-02-16

## 2022-02-16 VITALS
RESPIRATION RATE: 17 BRPM | HEIGHT: 72 IN | HEART RATE: 72 BPM | WEIGHT: 176.59 LBS | SYSTOLIC BLOOD PRESSURE: 137 MMHG | DIASTOLIC BLOOD PRESSURE: 77 MMHG | TEMPERATURE: 98 F | OXYGEN SATURATION: 100 %

## 2022-02-16 DIAGNOSIS — M16.12 UNILATERAL PRIMARY OSTEOARTHRITIS, LEFT HIP: ICD-10-CM

## 2022-02-16 DIAGNOSIS — I48.91 UNSPECIFIED ATRIAL FIBRILLATION: ICD-10-CM

## 2022-02-16 DIAGNOSIS — Z01.818 ENCOUNTER FOR OTHER PREPROCEDURAL EXAMINATION: ICD-10-CM

## 2022-02-16 DIAGNOSIS — Z98.890 OTHER SPECIFIED POSTPROCEDURAL STATES: Chronic | ICD-10-CM

## 2022-02-16 LAB
A1C WITH ESTIMATED AVERAGE GLUCOSE RESULT: 5.1 % — SIGNIFICANT CHANGE UP (ref 4–5.6)
ANION GAP SERPL CALC-SCNC: 1 MMOL/L — LOW (ref 5–17)
APTT BLD: 33.8 SEC — SIGNIFICANT CHANGE UP (ref 27.5–35.5)
BLD GP AB SCN SERPL QL: SIGNIFICANT CHANGE UP
BUN SERPL-MCNC: 22 MG/DL — SIGNIFICANT CHANGE UP (ref 7–23)
CALCIUM SERPL-MCNC: 8.9 MG/DL — SIGNIFICANT CHANGE UP (ref 8.5–10.1)
CHLORIDE SERPL-SCNC: 106 MMOL/L — SIGNIFICANT CHANGE UP (ref 96–108)
CO2 SERPL-SCNC: 32 MMOL/L — HIGH (ref 22–31)
CREAT SERPL-MCNC: 0.87 MG/DL — SIGNIFICANT CHANGE UP (ref 0.5–1.3)
ESTIMATED AVERAGE GLUCOSE: 100 MG/DL — SIGNIFICANT CHANGE UP (ref 68–114)
GLUCOSE SERPL-MCNC: 145 MG/DL — HIGH (ref 70–99)
HCT VFR BLD CALC: 40.7 % — SIGNIFICANT CHANGE UP (ref 39–50)
HGB BLD-MCNC: 14 G/DL — SIGNIFICANT CHANGE UP (ref 13–17)
INR BLD: 1.05 RATIO — SIGNIFICANT CHANGE UP (ref 0.88–1.16)
MCHC RBC-ENTMCNC: 34.4 G/DL — SIGNIFICANT CHANGE UP (ref 32–36)
MCHC RBC-ENTMCNC: 37.2 PG — HIGH (ref 27–34)
MCV RBC AUTO: 108.2 FL — HIGH (ref 80–100)
MRSA PCR RESULT.: SIGNIFICANT CHANGE UP
NRBC # BLD: 0 /100 WBCS — SIGNIFICANT CHANGE UP (ref 0–0)
PLATELET # BLD AUTO: 187 K/UL — SIGNIFICANT CHANGE UP (ref 150–400)
POTASSIUM SERPL-MCNC: 4.1 MMOL/L — SIGNIFICANT CHANGE UP (ref 3.5–5.3)
POTASSIUM SERPL-SCNC: 4.1 MMOL/L — SIGNIFICANT CHANGE UP (ref 3.5–5.3)
PROTHROM AB SERPL-ACNC: 12.2 SEC — SIGNIFICANT CHANGE UP (ref 10.6–13.6)
RBC # BLD: 3.76 M/UL — LOW (ref 4.2–5.8)
RBC # FLD: 12 % — SIGNIFICANT CHANGE UP (ref 10.3–14.5)
S AUREUS DNA NOSE QL NAA+PROBE: DETECTED
SODIUM SERPL-SCNC: 139 MMOL/L — SIGNIFICANT CHANGE UP (ref 135–145)
WBC # BLD: 4.94 K/UL — SIGNIFICANT CHANGE UP (ref 3.8–10.5)
WBC # FLD AUTO: 4.94 K/UL — SIGNIFICANT CHANGE UP (ref 3.8–10.5)

## 2022-02-16 NOTE — OCCUPATIONAL THERAPY INITIAL EVALUATION ADULT - ADDITIONAL COMMENTS
Pt lives with spouse and family (Who can assist post op) in a private house with 3 1/2 steps with bilateral handrails that are far apart. Once inside, the pt bedroom and bathroom is on that floor when entering. The pts bathroom has tub/shower combination, fixed shower head, standard toilet seat and no grab bars. The pt reports that he owns a 3/1 commode. The pt ambulates with a cane prn and owns a rolling walker. The pt daily pain is a 3/10 at rest and a 7/10 with movement. The pt manages the pain with rest and Tylenol. The pt has no recent outpatient PT, no recent falls and has no buckling of the knees. The pt wears glasses for distance, L handed, drives and has hearing loss in R ear (No hearing aides).

## 2022-02-16 NOTE — H&P PST ADULT - PROBLEM SELECTOR PLAN 1
Left total hip arthroplasty direct anterior approach  labs - cbc,pt/ptt,bmp,t&s,nose cx,ekg  M/C required  cardiac clearance  preop 3 day hibiclens instruction reviewed and given .instructed on if  nose cx positive use mupuricin 5 days and checklist given  take routine meds DOS with sips of water. avoid NSAID and OTC supplements. verbalized understanding  information on proper nutrition , increase protein and better food choices provided in packet   Ensure clear given  Anesthesiologist to review PST labs, EKG, required clearances and optimization for surgery.

## 2022-02-16 NOTE — OCCUPATIONAL THERAPY INITIAL EVALUATION ADULT - PERTINENT HX OF CURRENT PROBLEM, REHAB EVAL
L hip OA which impacts pts ability to perform functional tasks/transfers and mobility. Pt is scheduled for L THR on 3/8/22.

## 2022-02-16 NOTE — H&P PST ADULT - ASSESSMENT
76M pmh afib (on coumadin) c/o left hip pain 2/2 unilateral primary osteoarthritis here for PST for scheduled Left total hip arthroplasty  CAPRINI SCORE    AGE RELATED RISK FACTORS                                                       MOBILITY RELATED FACTORS  [ ] Age 41-60 years                                            (1 Point)                  [ ] Bed rest                                                        (1 Point)  [ ] Age: 61-74 years                                           (2 Points)                [ ] Plaster cast                                                   (2 Points)  [x ] Age= 75 years                                              (3 Points)                 [ ] Bed bound for more than 72 hours                   (2 Points)    DISEASE RELATED RISK FACTORS                                               GENDER SPECIFIC FACTORS  [ ] Edema in the lower extremities                       (1 Point)                  [ ] Pregnancy                                                     (1 Point)  [ ] Varicose veins                                               (1 Point)                  [ ] Post-partum < 6 weeks                                   (1 Point)             [x ] BMI > 25 Kg/m2                                            (1 Point)                  [ ] Hormonal therapy  or oral contraception            (1 Point)                 [ ] Sepsis (in the previous month)                        (1 Point)                  [ ] History of pregnancy complications  [ ] Pneumonia or serious lung disease                                               [ ] Unexplained or recurrent                       (1 Point)           (in the previous month)                               (1 Point)  [ ] Abnormal pulmonary function test                     (1 Point)                 SURGERY RELATED RISK FACTORS  [ ] Acute myocardial infarction                              (1 Point)                 [ ]  Section                                            (1 Point)  [ ] Congestive heart failure (in the previous month)  (1 Point)                 [ ] Minor surgery                                                 (1 Point)   [ ] Inflammatory bowel disease                             (1 Point)                 [ ] Arthroscopic surgery                                        (2 Points)  [ ] Central venous access                                    (2 Points)                [ ] General surgery lasting more than 45 minutes   (2 Points)       [ ] Stroke (in the previous month)                          (5 Points)               [x ] Elective arthroplasty                                        (5 Points)                                                                                                                                               HEMATOLOGY RELATED FACTORS                                                 TRAUMA RELATED RISK FACTORS  [ ] Prior episodes of VTE                                     (3 Points)                 [ ] Fracture of the hip, pelvis, or leg                       (5 Points)  [ ] Positive family history for VTE                         (3 Points)                 [ ] Acute spinal cord injury (in the previous month)  (5 Points)  [ ] Prothrombin 53463 A                                      (3 Points)                 [ ] Paralysis  (less than 1 month)                          (5 Points)  [ ] Factor V Leiden                                             (3 Points)                 [ ] Multiple Trauma within 1 month                         (5 Points)  [ ] Lupus anticoagulants                                     (3 Points)                                                           [ ] Anticardiolipin antibodies                                (3 Points)                                                       [ ] High homocysteine in the blood                      (3 Points)                                             [ ] Other congenital or acquired thrombophilia       (3 Points)                                                [ ] Heparin induced thrombocytopenia                  (3 Points)                                          Total Score [    9      ]

## 2022-02-16 NOTE — H&P PST ADULT - HISTORY OF PRESENT ILLNESS
.......76M smoker h/o afib ,,isthmic spondylolisthesis and spinal stenosis with L5 radiculopathy who  failed conservative management is scheduled for L3-S1 Posterior Lumbar Laminectomy L5-S1 Instrumented spinal fusion on 05/04/2021.  Denies Recent travel, Exposure or Covid symptoms  Covid PCR Test on 05/01/2021 76M pmh afib (on coumadin) c/o left hip pain 2/2 unilateral primary osteoarthritis here for PST for scheduled Left total hip arthroplasty  This patient denies any fever, cough, sob, flu like symptoms or travel outside of the [place] in the past 30 days

## 2022-02-16 NOTE — PHYSICAL THERAPY INITIAL EVALUATION ADULT - ADDITIONAL COMMENTS
Pt lives in a private house, 3 entry steps ( B wide handrail), 1 level inside home.  Pt has a tub/shower combo with a fixed shower head, standard toilet seat height, & no grab bars. 3:1 commode will fit in bathroom. DME: RW, straight cane, 3:1 commode (in good working condition & easily accessible).  Pt reports daily 3/10 pain & states it is worse with any activity 7/10. Pt endorses taking Tylenol for pain management. Denies adverse reaction to pain medication. Pt is left hand dominant, wears eye glasses, drives, R ear hearing impairment but no hearing aid.  Does not attend Outpatient PT, denies recent falls and no buckling reported. Pt lives in a private house, 3 entry steps ( B wide handrail), 1 level inside home.  Pt has a tub/shower combo with a fixed shower head, standard toilet seat height, & no grab bars. 3:1 commode will fit in bathroom. DME: RW, straight cane, 3:1 commode (in good working condition & easily accessible).  Pt reports daily 3/10 pain & states it is worse with any activity 7/10. Pt endorses taking Tylenol for pain management. Denies adverse reaction to pain medication. Pt is left hand dominant, wears eye glasses, drives, R ear hearing impairment but no hearing aid.  Does not attend Outpatient PT, denies recent falls and no buckling reported. PSHx 5/2021 TLIF PSF L5-S1.

## 2022-02-17 RX ORDER — MUPIROCIN 20 MG/G
1 OINTMENT TOPICAL
Qty: 22 | Refills: 0
Start: 2022-02-17 | End: 2022-02-21

## 2022-02-18 RX ORDER — MUPIROCIN 20 MG/G
2 OINTMENT TOPICAL
Qty: 1 | Refills: 0 | Status: ACTIVE | COMMUNITY
Start: 2022-02-18 | End: 1900-01-01

## 2022-03-07 ENCOUNTER — FORM ENCOUNTER (OUTPATIENT)
Age: 77
End: 2022-03-07

## 2022-03-08 ENCOUNTER — APPOINTMENT (OUTPATIENT)
Dept: ORTHOPEDIC SURGERY | Facility: HOSPITAL | Age: 77
End: 2022-03-08

## 2022-03-08 ENCOUNTER — TRANSCRIPTION ENCOUNTER (OUTPATIENT)
Age: 77
End: 2022-03-08

## 2022-03-08 ENCOUNTER — OUTPATIENT (OUTPATIENT)
Dept: OUTPATIENT SERVICES | Facility: HOSPITAL | Age: 77
LOS: 1 days | Discharge: HOME | End: 2022-03-08

## 2022-03-08 ENCOUNTER — RESULT REVIEW (OUTPATIENT)
Age: 77
End: 2022-03-08

## 2022-03-08 DIAGNOSIS — Z98.890 OTHER SPECIFIED POSTPROCEDURAL STATES: Chronic | ICD-10-CM

## 2022-03-08 DIAGNOSIS — M96.830 POSTPROCEDURAL HEMORRHAGE OF A MUSCULOSKELETAL STRUCTURE FOLLOWING A MUSCULOSKELETAL SYSTEM PROCEDURE: ICD-10-CM

## 2022-03-08 DIAGNOSIS — Z79.82 LONG TERM (CURRENT) USE OF ASPIRIN: ICD-10-CM

## 2022-03-08 DIAGNOSIS — I48.91 UNSPECIFIED ATRIAL FIBRILLATION: ICD-10-CM

## 2022-03-08 RX ORDER — AMOXICILLIN 250 MG/5ML
1 SUSPENSION, RECONSTITUTED, ORAL (ML) ORAL
Qty: 0 | Refills: 0 | DISCHARGE

## 2022-03-09 ENCOUNTER — NON-APPOINTMENT (OUTPATIENT)
Age: 77
End: 2022-03-09

## 2022-03-09 ENCOUNTER — EMERGENCY (EMERGENCY)
Facility: HOSPITAL | Age: 77
LOS: 0 days | Discharge: HOME | End: 2022-03-09
Attending: EMERGENCY MEDICINE
Payer: MEDICARE

## 2022-03-09 VITALS
TEMPERATURE: 97 F | SYSTOLIC BLOOD PRESSURE: 128 MMHG | HEART RATE: 113 BPM | WEIGHT: 160.06 LBS | OXYGEN SATURATION: 97 % | DIASTOLIC BLOOD PRESSURE: 63 MMHG | HEIGHT: 72 IN | RESPIRATION RATE: 17 BRPM

## 2022-03-09 DIAGNOSIS — Z79.82 LONG TERM (CURRENT) USE OF ASPIRIN: ICD-10-CM

## 2022-03-09 DIAGNOSIS — I48.91 UNSPECIFIED ATRIAL FIBRILLATION: ICD-10-CM

## 2022-03-09 DIAGNOSIS — Z90.49 ACQUIRED ABSENCE OF OTHER SPECIFIED PARTS OF DIGESTIVE TRACT: Chronic | ICD-10-CM

## 2022-03-09 DIAGNOSIS — M96.830 POSTPROCEDURAL HEMORRHAGE OF A MUSCULOSKELETAL STRUCTURE FOLLOWING A MUSCULOSKELETAL SYSTEM PROCEDURE: ICD-10-CM

## 2022-03-09 DIAGNOSIS — Z98.890 OTHER SPECIFIED POSTPROCEDURAL STATES: Chronic | ICD-10-CM

## 2022-03-09 PROCEDURE — 99283 EMERGENCY DEPT VISIT LOW MDM: CPT

## 2022-03-09 RX ORDER — ASPIRIN/CALCIUM CARB/MAGNESIUM 324 MG
1 TABLET ORAL
Qty: 0 | Refills: 0 | DISCHARGE

## 2022-03-09 RX ORDER — METOPROLOL TARTRATE 50 MG
1.5 TABLET ORAL
Qty: 0 | Refills: 0 | DISCHARGE

## 2022-03-09 RX ORDER — DIGOXIN 250 MCG
1 TABLET ORAL
Qty: 0 | Refills: 0 | DISCHARGE

## 2022-03-09 RX ORDER — MELOXICAM 15 MG/1
1 TABLET ORAL
Qty: 0 | Refills: 0 | DISCHARGE

## 2022-03-09 NOTE — CONSULT NOTE ADULT - SUBJECTIVE AND OBJECTIVE BOX
76M s/p L JORDAN with Dr. Bender yesterday (3/8) who presents to [facility] ED for evaluation of wound drainage/bleeding. Patient reports that his son noticed some blood at the proximal aspect of his dressing when he was getting into their car after discharge from the hospital this morning. He reports by the time he got home his underwear and the dressing were soaked with blood from continued bleeding so was advised by Dr. Bender's office to return to ED for dressing change and wound evaluation. Otherwise denies falls/trauma, numbness/tingling, weakness, fever/chills, or any other acute orthopedic complaints.     Exam:  General: Awake alert pleasant no acute distress, ambulating in ED  LLE:   Aquacel dressing overlying incision site saturated distal>proximal. Surgical incision intact, mild blood oozing at junction of middle and distal third of incision. No obvious wound dehiscence. No purulence.   Mild hilton-incisional TTP. No other bony TTP appreciated  +Q/H/Gsc/TA/EHL/FHL, SILT  DP pulse palpable  Compartments soft and compressible  No calf TTP bilaterally    Plan:  Wound evaluated, no evidence of dehiscence but persistent blood oozing from site noted above.   Decision made with Dr. Bender to place Prevena wound vac dressing; Placed sterilely at bedside.   Pain control PRN  Continue postop DVT ppx: A81 BID  WBAT/PT/OT  Plan for Prevena wound vac discussed with patient. Advised to keep dressing dry and sponge bathe until otherwise instructed. Patient expressed clear understanding of treatment plan and will schedule appointment to see Dr. Bender in one week.   No acute orthopedic surgical intervention indicated at this time. Ortho stable for discharge. Patient to follow up with Dr. Bender in one week for wound evaluation (3/16). Instructed to call office for appointment  Discussed with attending Dr. Bender who agrees with plan

## 2022-03-09 NOTE — ED ADULT TRIAGE NOTE - CHIEF COMPLAINT QUOTE
as per patient c/o bleeding from surgical site. Pt had L hip replacement yesterday, discharge from hospital today. Surgical dressing saturated.

## 2022-03-09 NOTE — ED PROVIDER NOTE - PATIENT PORTAL LINK FT
You can access the FollowMyHealth Patient Portal offered by [facility] by registering at the following website: [email or web address]. By joining [facility]’s FollowMyHealth portal, you will also be able to view your health information using other applications (apps) compatible with our system.

## 2022-03-09 NOTE — ED PROVIDER NOTE - CLINICAL SUMMARY MEDICAL DECISION MAKING FREE TEXT BOX
Pt with saturated dressing, discharged over x1 hour ago from this hospital. Special surgical dressings were applied, will contact ortho to supply dressings or consider wound vac.

## 2022-03-09 NOTE — ED ADULT NURSE NOTE - NSICDXPASTSURGICALHX_GEN_ALL_CORE_FT
PAST SURGICAL HISTORY:  H/O arthroscopy bilateral knees    H/O parotidectomy     S/P spinal surgery

## 2022-03-09 NOTE — ED PROVIDER NOTE - SKIN, MLM
Skin normal color for race, warm, dry and intact. No evidence of rash. dressing saturated with sanguinous fluids

## 2022-03-09 NOTE — ED ADULT NURSE REASSESSMENT NOTE - NS ED NURSE REASSESS COMMENT FT1
Dr. Waterman from ortho here at bedside to redress wound. considering wound vac. Dr. Waterman from ortho here at bedside to redress wound. prevena wound vac placed by Dr. Waterman.

## 2022-03-09 NOTE — ED PROVIDER NOTE - NSFOLLOWUPINSTRUCTIONS_ED_ALL_ED_FT
1) Follow-up with Orthopedics as you were previously instructed  2) Follow up with your primary care doctor  3) Return to the ER for worsening or concerning symptoms

## 2022-03-09 NOTE — ED CLERICAL - NS ED CARE COORDINATION INFORMATION
This patient is eligible for (or currently enrolled in) an outpatient care management program available through [facility]. This program can coordinate outpatient follow up and assist the patient in accessing a variety of outpatient resources.  If discharged from the ED, the patient will be contacted to see if any additional resources are needed.                                                                                    Please call the Nurse Clinical Call Center at [phone] with any questions or for assistance in discharge planning.

## 2022-03-09 NOTE — ED PROVIDER NOTE - OBJECTIVE STATEMENT
77 yo male recently discharged x1.5 hour PTA after hip replacement. Pt went home and noted significant bleeding saturating through his dressing. No new falls or increased pain. Pt was instructed on the dressing to not remove it until March 18th but due to extent of saturation he was concerned so returned to ED.

## 2022-03-09 NOTE — ED PROVIDER NOTE - PROGRESS NOTE DETAILS
Ortho paged Ortho aware. Dr. Waterman at bedside states patient is cleared to go home.  Wound vac was placed.

## 2022-03-09 NOTE — ED ADULT NURSE NOTE - OBJECTIVE STATEMENT
pt 77 y/o male c/c of bleed from surgical site. AAOX4, walker at bedside. pt states he had L hip replacement yesterday, was discharged, found surgical dressing and pants saturated with red blood as per patient. was brought in by his son. pt denies pain at site. surgical dressing remains intact, scant drainage seeping through dressing noted. fall with harm precautions maintained.

## 2022-03-10 ENCOUNTER — NON-APPOINTMENT (OUTPATIENT)
Age: 77
End: 2022-03-10

## 2022-03-16 ENCOUNTER — APPOINTMENT (OUTPATIENT)
Dept: ORTHOPEDIC SURGERY | Facility: CLINIC | Age: 77
End: 2022-03-16
Payer: MEDICARE

## 2022-03-16 PROCEDURE — 99024 POSTOP FOLLOW-UP VISIT: CPT

## 2022-03-16 PROCEDURE — 73502 X-RAY EXAM HIP UNI 2-3 VIEWS: CPT

## 2022-03-16 NOTE — PHYSICAL EXAM
[de-identified] : Well developed, well nourished in no apparent distress, awake, alert and orientated to person, place and time with appropriate mood and affect\par Respirations are even and unlabored. Gait evaluation does not reveal a limp. There is no inguinal adenopathy. The affected limb is well-perfused with palpable pedal pulse, without skin lesions, shows a grossly normal motor and sensory examination. Incision is healed Hip motion is full and painless throughout ROM. Leg lengths are approximately equal  [de-identified] : AP pelvis, AP hip, and lateral x-rays of the left hip were ordered and obtained in the office and demonstrate satisfactory position and alignment of the components are present. No signs of loosening are seen.

## 2022-03-16 NOTE — DISCUSSION/SUMMARY
[de-identified] : The patient is doing well after joint replacement surgery. Written infectious precautions were reviewed. The patient will progress with physical therapy at this time and they will work on transitioning from requiring assistive devices for ambulation. Anti-coagulant therapy will be discontinued at 1 month post surgery for the purpose of orthopedic thromboembolism prophylaxis. Return around the 6 week anniversary from surgery for follow-up evaluation.

## 2022-03-16 NOTE — HISTORY OF PRESENT ILLNESS
[de-identified] : Status-post left total hip  arthroplasty here for initial postoperative evaluation. Excellent progress is noted in terms of pain and restoration of function. Pain is well controlled with oral medications. There has been no change in medical health since discharge. The patient does require assistive devices.

## 2022-03-21 DIAGNOSIS — M16.12 UNILATERAL PRIMARY OSTEOARTHRITIS, LEFT HIP: ICD-10-CM

## 2022-03-21 RX ORDER — TRAMADOL HYDROCHLORIDE 50 MG/1
50 TABLET, COATED ORAL
Qty: 40 | Refills: 0 | Status: ACTIVE | COMMUNITY
Start: 2022-03-21 | End: 1900-01-01

## 2022-04-14 RX ORDER — ACETAMINOPHEN 650 MG/1
650 TABLET, EXTENDED RELEASE ORAL 4 TIMES DAILY
Qty: 28 | Refills: 0 | Status: ACTIVE | COMMUNITY
Start: 2022-04-14 | End: 1900-01-01

## 2022-04-14 RX ORDER — ASPIRIN ENTERIC COATED TABLETS 81 MG 81 MG/1
81 TABLET, DELAYED RELEASE ORAL
Qty: 60 | Refills: 0 | Status: ACTIVE | COMMUNITY
Start: 2022-04-14 | End: 1900-01-01

## 2022-05-02 ENCOUNTER — APPOINTMENT (OUTPATIENT)
Dept: ORTHOPEDIC SURGERY | Facility: CLINIC | Age: 77
End: 2022-05-02
Payer: MEDICARE

## 2022-05-02 VITALS — HEIGHT: 72 IN | BODY MASS INDEX: 24.11 KG/M2 | WEIGHT: 178 LBS

## 2022-05-02 PROCEDURE — 99024 POSTOP FOLLOW-UP VISIT: CPT

## 2022-05-02 NOTE — HISTORY OF PRESENT ILLNESS
[de-identified] : Status-post left total hip  arthroplasty here for routine postoperative evaluation. Excellent progress is noted in terms of pain and restoration of function. Pain is well controlled with oral medications. There has been no change in medical health since discharge. The patient does not require assistive devices.

## 2022-05-02 NOTE — DISCUSSION/SUMMARY
[de-identified] : The patient is doing well after joint replacement surgery.  Continue weight-bear as tolerated.  Return around the 6 month anniversary from surgery for follow-up evaluation.

## 2022-05-02 NOTE — PHYSICAL EXAM
[de-identified] : Well developed, well nourished in no apparent distress, awake, alert and orientated to person, place and time with appropriate mood and affect\par Respirations are even and unlabored. Gait evaluation does not reveal a limp. There is no inguinal adenopathy. The affected limb is well-perfused with palpable pedal pulse, without skin lesions, shows a grossly normal motor and sensory examination. Incision is healed Hip motion is full and painless throughout ROM. Leg lengths are approximately equal

## 2022-05-09 ENCOUNTER — APPOINTMENT (OUTPATIENT)
Dept: ORTHOPEDIC SURGERY | Facility: CLINIC | Age: 77
End: 2022-05-09
Payer: MEDICARE

## 2022-05-09 DIAGNOSIS — M43.16 SPONDYLOLISTHESIS, LUMBAR REGION: ICD-10-CM

## 2022-05-09 PROCEDURE — 99214 OFFICE O/P EST MOD 30 MIN: CPT

## 2022-05-09 PROCEDURE — 72100 X-RAY EXAM L-S SPINE 2/3 VWS: CPT

## 2022-05-11 RX ORDER — OXYCODONE 5 MG/1
5 TABLET ORAL
Qty: 40 | Refills: 0 | Status: ACTIVE | COMMUNITY
Start: 2022-03-29 | End: 1900-01-01

## 2022-08-16 NOTE — ED ADULT NURSE NOTE - PAIN: PRESENCE, MLM
Has The Growth Been Previously Biopsied?: has been previously biopsied Body Location Override (Optional): Left dorsal hand denies pain/discomfort

## 2022-09-06 ENCOUNTER — INPATIENT (INPATIENT)
Facility: HOSPITAL | Age: 77
LOS: 3 days | Discharge: HOME | End: 2022-09-10
Attending: STUDENT IN AN ORGANIZED HEALTH CARE EDUCATION/TRAINING PROGRAM | Admitting: STUDENT IN AN ORGANIZED HEALTH CARE EDUCATION/TRAINING PROGRAM

## 2022-09-06 VITALS
TEMPERATURE: 98 F | HEIGHT: 72 IN | WEIGHT: 175.05 LBS | OXYGEN SATURATION: 100 % | SYSTOLIC BLOOD PRESSURE: 87 MMHG | DIASTOLIC BLOOD PRESSURE: 57 MMHG | HEART RATE: 87 BPM | RESPIRATION RATE: 16 BRPM

## 2022-09-06 DIAGNOSIS — Z98.890 OTHER SPECIFIED POSTPROCEDURAL STATES: Chronic | ICD-10-CM

## 2022-09-06 DIAGNOSIS — K92.2 GASTROINTESTINAL HEMORRHAGE, UNSPECIFIED: ICD-10-CM

## 2022-09-06 DIAGNOSIS — I48.20 CHRONIC ATRIAL FIBRILLATION, UNSPECIFIED: ICD-10-CM

## 2022-09-06 DIAGNOSIS — I10 ESSENTIAL (PRIMARY) HYPERTENSION: ICD-10-CM

## 2022-09-06 DIAGNOSIS — Z90.49 ACQUIRED ABSENCE OF OTHER SPECIFIED PARTS OF DIGESTIVE TRACT: Chronic | ICD-10-CM

## 2022-09-06 LAB
ALBUMIN SERPL ELPH-MCNC: 3.4 G/DL — SIGNIFICANT CHANGE UP (ref 3.3–5)
ALP SERPL-CCNC: 47 U/L — SIGNIFICANT CHANGE UP (ref 40–120)
ALT FLD-CCNC: 15 U/L — SIGNIFICANT CHANGE UP (ref 12–78)
ANION GAP SERPL CALC-SCNC: 7 MMOL/L — SIGNIFICANT CHANGE UP (ref 5–17)
ANISOCYTOSIS BLD QL: SLIGHT — SIGNIFICANT CHANGE UP
APTT BLD: 25.7 SEC — LOW (ref 27.5–35.5)
AST SERPL-CCNC: 12 U/L — LOW (ref 15–37)
BASOPHILS # BLD AUTO: 0.04 K/UL — SIGNIFICANT CHANGE UP (ref 0–0.2)
BASOPHILS NFR BLD AUTO: 0.3 % — SIGNIFICANT CHANGE UP (ref 0–2)
BILIRUB SERPL-MCNC: 0.7 MG/DL — SIGNIFICANT CHANGE UP (ref 0.2–1.2)
BLD GP AB SCN SERPL QL: SIGNIFICANT CHANGE UP
BUN SERPL-MCNC: 61 MG/DL — HIGH (ref 7–23)
CALCIUM SERPL-MCNC: 9.2 MG/DL — SIGNIFICANT CHANGE UP (ref 8.5–10.1)
CHLORIDE SERPL-SCNC: 106 MMOL/L — SIGNIFICANT CHANGE UP (ref 96–108)
CO2 SERPL-SCNC: 29 MMOL/L — SIGNIFICANT CHANGE UP (ref 22–31)
CREAT SERPL-MCNC: 1 MG/DL — SIGNIFICANT CHANGE UP (ref 0.5–1.3)
EGFR: 78 ML/MIN/1.73M2 — SIGNIFICANT CHANGE UP
EOSINOPHIL # BLD AUTO: 0.01 K/UL — SIGNIFICANT CHANGE UP (ref 0–0.5)
EOSINOPHIL NFR BLD AUTO: 0.1 % — SIGNIFICANT CHANGE UP (ref 0–6)
FLUAV AG NPH QL: SIGNIFICANT CHANGE UP
FLUBV AG NPH QL: SIGNIFICANT CHANGE UP
GLUCOSE SERPL-MCNC: 148 MG/DL — HIGH (ref 70–99)
HCT VFR BLD CALC: 25 % — LOW (ref 39–50)
HCT VFR BLD CALC: 27.8 % — LOW (ref 39–50)
HCT VFR BLD CALC: 31.2 % — LOW (ref 39–50)
HGB BLD-MCNC: 10.4 G/DL — LOW (ref 13–17)
HGB BLD-MCNC: 8.6 G/DL — LOW (ref 13–17)
HGB BLD-MCNC: 9.4 G/DL — LOW (ref 13–17)
HYPOCHROMIA BLD QL: SLIGHT — SIGNIFICANT CHANGE UP
IMM GRANULOCYTES NFR BLD AUTO: 0.8 % — SIGNIFICANT CHANGE UP (ref 0–1.5)
INR BLD: 1.34 RATIO — HIGH (ref 0.88–1.16)
LIDOCAIN IGE QN: 54 U/L — LOW (ref 73–393)
LYMPHOCYTES # BLD AUTO: 1.83 K/UL — SIGNIFICANT CHANGE UP (ref 1–3.3)
LYMPHOCYTES # BLD AUTO: 11.9 % — LOW (ref 13–44)
MACROCYTES BLD QL: SLIGHT — SIGNIFICANT CHANGE UP
MANUAL SMEAR VERIFICATION: SIGNIFICANT CHANGE UP
MCHC RBC-ENTMCNC: 33.3 G/DL — SIGNIFICANT CHANGE UP (ref 32–36)
MCHC RBC-ENTMCNC: 33.8 G/DL — SIGNIFICANT CHANGE UP (ref 32–36)
MCHC RBC-ENTMCNC: 34.4 G/DL — SIGNIFICANT CHANGE UP (ref 32–36)
MCHC RBC-ENTMCNC: 36.6 PG — HIGH (ref 27–34)
MCHC RBC-ENTMCNC: 37.2 PG — HIGH (ref 27–34)
MCHC RBC-ENTMCNC: 37.9 PG — HIGH (ref 27–34)
MCV RBC AUTO: 109.9 FL — HIGH (ref 80–100)
MCV RBC AUTO: 109.9 FL — HIGH (ref 80–100)
MCV RBC AUTO: 110.1 FL — HIGH (ref 80–100)
MONOCYTES # BLD AUTO: 0.8 K/UL — SIGNIFICANT CHANGE UP (ref 0–0.9)
MONOCYTES NFR BLD AUTO: 5.2 % — SIGNIFICANT CHANGE UP (ref 2–14)
NEUTROPHILS # BLD AUTO: 12.53 K/UL — HIGH (ref 1.8–7.4)
NEUTROPHILS NFR BLD AUTO: 81.7 % — HIGH (ref 43–77)
NRBC # BLD: 0 /100 WBCS — SIGNIFICANT CHANGE UP (ref 0–0)
PLAT MORPH BLD: NORMAL — SIGNIFICANT CHANGE UP
PLATELET # BLD AUTO: 144 K/UL — LOW (ref 150–400)
PLATELET # BLD AUTO: 156 K/UL — SIGNIFICANT CHANGE UP (ref 150–400)
PLATELET # BLD AUTO: 193 K/UL — SIGNIFICANT CHANGE UP (ref 150–400)
POTASSIUM SERPL-MCNC: 4.3 MMOL/L — SIGNIFICANT CHANGE UP (ref 3.5–5.3)
POTASSIUM SERPL-SCNC: 4.3 MMOL/L — SIGNIFICANT CHANGE UP (ref 3.5–5.3)
PROT SERPL-MCNC: 6.8 GM/DL — SIGNIFICANT CHANGE UP (ref 6–8.3)
PROTHROM AB SERPL-ACNC: 16 SEC — HIGH (ref 10.5–13.4)
RBC # BLD: 2.27 M/UL — LOW (ref 4.2–5.8)
RBC # BLD: 2.53 M/UL — LOW (ref 4.2–5.8)
RBC # BLD: 2.84 M/UL — LOW (ref 4.2–5.8)
RBC # FLD: 12.7 % — SIGNIFICANT CHANGE UP (ref 10.3–14.5)
RBC # FLD: 12.7 % — SIGNIFICANT CHANGE UP (ref 10.3–14.5)
RBC # FLD: 12.9 % — SIGNIFICANT CHANGE UP (ref 10.3–14.5)
RBC BLD AUTO: ABNORMAL
SARS-COV-2 RNA SPEC QL NAA+PROBE: SIGNIFICANT CHANGE UP
SODIUM SERPL-SCNC: 142 MMOL/L — SIGNIFICANT CHANGE UP (ref 135–145)
WBC # BLD: 10.51 K/UL — HIGH (ref 3.8–10.5)
WBC # BLD: 13.8 K/UL — HIGH (ref 3.8–10.5)
WBC # BLD: 15.33 K/UL — HIGH (ref 3.8–10.5)
WBC # FLD AUTO: 10.51 K/UL — HIGH (ref 3.8–10.5)
WBC # FLD AUTO: 13.8 K/UL — HIGH (ref 3.8–10.5)
WBC # FLD AUTO: 15.33 K/UL — HIGH (ref 3.8–10.5)

## 2022-09-06 PROCEDURE — 99285 EMERGENCY DEPT VISIT HI MDM: CPT

## 2022-09-06 PROCEDURE — 93010 ELECTROCARDIOGRAM REPORT: CPT

## 2022-09-06 PROCEDURE — 71045 X-RAY EXAM CHEST 1 VIEW: CPT | Mod: 26

## 2022-09-06 PROCEDURE — 99222 1ST HOSP IP/OBS MODERATE 55: CPT

## 2022-09-06 RX ORDER — METOPROLOL TARTRATE 50 MG
100 TABLET ORAL
Refills: 0 | Status: DISCONTINUED | OUTPATIENT
Start: 2022-09-06 | End: 2022-09-10

## 2022-09-06 RX ORDER — PANTOPRAZOLE SODIUM 20 MG/1
80 TABLET, DELAYED RELEASE ORAL ONCE
Refills: 0 | Status: COMPLETED | OUTPATIENT
Start: 2022-09-06 | End: 2022-09-06

## 2022-09-06 RX ORDER — SODIUM CHLORIDE 9 MG/ML
1000 INJECTION INTRAMUSCULAR; INTRAVENOUS; SUBCUTANEOUS ONCE
Refills: 0 | Status: COMPLETED | OUTPATIENT
Start: 2022-09-06 | End: 2022-09-06

## 2022-09-06 RX ORDER — INFLUENZA VIRUS VACCINE 15; 15; 15; 15 UG/.5ML; UG/.5ML; UG/.5ML; UG/.5ML
0.7 SUSPENSION INTRAMUSCULAR ONCE
Refills: 0 | Status: DISCONTINUED | OUTPATIENT
Start: 2022-09-06 | End: 2022-09-10

## 2022-09-06 RX ORDER — PANTOPRAZOLE SODIUM 20 MG/1
8 TABLET, DELAYED RELEASE ORAL
Qty: 80 | Refills: 0 | Status: DISCONTINUED | OUTPATIENT
Start: 2022-09-06 | End: 2022-09-07

## 2022-09-06 RX ORDER — DIGOXIN 250 MCG
250 TABLET ORAL DAILY
Refills: 0 | Status: DISCONTINUED | OUTPATIENT
Start: 2022-09-06 | End: 2022-09-10

## 2022-09-06 RX ADMIN — PANTOPRAZOLE SODIUM 10 MG/HR: 20 TABLET, DELAYED RELEASE ORAL at 15:20

## 2022-09-06 RX ADMIN — SODIUM CHLORIDE 1000 MILLILITER(S): 9 INJECTION INTRAMUSCULAR; INTRAVENOUS; SUBCUTANEOUS at 16:00

## 2022-09-06 RX ADMIN — PANTOPRAZOLE SODIUM 80 MILLIGRAM(S): 20 TABLET, DELAYED RELEASE ORAL at 14:20

## 2022-09-06 RX ADMIN — PANTOPRAZOLE SODIUM 10 MG/HR: 20 TABLET, DELAYED RELEASE ORAL at 23:28

## 2022-09-06 NOTE — H&P ADULT - ASSESSMENT
Patient is a 77M with a PMH of Afib on eliquis, HTN who presents to the ED for rectal bleeding.  Patient states that yesterday evening he developed abdominal discomfort which he thought was indigestion.  Later had a loose BM that noted to have a significant amount of bright red blood in it.  Patient states that in total he had 6 bloody BMs.  Bleeding continued into this morning.  Patient states that he tried to run an errand this morning but felt weak and lightheaded so he decided to present to the ED.  Patient currently has no active complaints.  Admits to smoking cigars daily but denies cigarette use.  Patient also reports drinking ~2 beers daily.  Last colonoscopy 4 years ago - no abnormal results.  BP initially low but improved with IV fluids.  ED reached out to Dr Boo for consultation.  Will admit to med surg.

## 2022-09-06 NOTE — ED ADULT NURSE NOTE - OBJECTIVE STATEMENT
Patient states he noticed blood in his stool that started last night. Patient has history of AFib on Eliquis. Patient denies pain at this time.

## 2022-09-06 NOTE — PATIENT PROFILE ADULT - FUNCTIONAL SCREEN CURRENT LEVEL: COMMUNICATION, MLM
[de-identified] : r copious cerumen rmeoved atraumatically with hook [Normal] : no neck adenopathy 0 = understands/communicates without difficulty

## 2022-09-06 NOTE — ED PROVIDER NOTE - OBJECTIVE STATEMENT
78 yo M w/ PMH afib on Eliquis, daily alcohol use presents to ED for bloody stool since yesterday. Endorses feeling weak however denies lightheadedness, CP, abdominal pain, and vomit. Had a colonoscopy done 4 years ago which was negative. Has no h/o GI bleed or blood transfusion.

## 2022-09-06 NOTE — CONSULT NOTE ADULT - SUBJECTIVE AND OBJECTIVE BOX
Full Consult dictated    Plan:  Patient is a 77M with a PMH of Afib on eliquis, HTN who presented to the ED with melena x 1 day f/b BRBPR.   Patient states that yesterday evening he developed abdominal discomfort which he thought was indigestion. Admits to smoking cigars daily but denies cigarette use.  Patient also reports drinking ~2 beers daily.  Last colonoscopy 4 years ago - no abnormal results.  BP initially low but improved with IV fluids.    +ASA and eliquis which are both being held. Continue IV protonix; clear liquids; NPO after MN for possible EGD in AM if endoscopy schedule permits. CBC stat; CBC q6h - Will transfuse if Hgb drops below 8-8.5

## 2022-09-06 NOTE — H&P ADULT - NSHPLABSRESULTS_GEN_ALL_CORE
Recent Vitals  T(C): 36.7 (09-06-22 @ 18:03), Max: 36.7 (09-06-22 @ 18:03)  HR: 91 (09-06-22 @ 18:03) (87 - 91)  BP: 107/43 (09-06-22 @ 18:03) (87/57 - 116/73)  RR: 18 (09-06-22 @ 18:03) (16 - 18)  SpO2: 99% (09-06-22 @ 18:03) (99% - 100%)                        9.4    13.80 )-----------( 156      ( 06 Sep 2022 16:15 )             27.8     09-06    142  |  106  |  61<H>  ----------------------------<  148<H>  4.3   |  29  |  1.00    Ca    9.2      06 Sep 2022 14:25    TPro  6.8  /  Alb  3.4  /  TBili  0.7  /  DBili  x   /  AST  12<L>  /  ALT  15  /  AlkPhos  47  09-06    PT/INR - ( 06 Sep 2022 14:25 )   PT: 16.0 sec;   INR: 1.34 ratio         PTT - ( 06 Sep 2022 14:25 )  PTT:25.7 sec  LIVER FUNCTIONS - ( 06 Sep 2022 14:25 )  Alb: 3.4 g/dL / Pro: 6.8 gm/dL / ALK PHOS: 47 U/L / ALT: 15 U/L / AST: 12 U/L / GGT: x               Home Medications:  digoxin 250 mcg (0.25 mg) oral tablet: 1  orally once a day (at bedtime) (09 Mar 2022 16:47)  metoprolol tartrate 100 mg oral tablet: 1.5  orally 2 times a day (09 Mar 2022 16:47)  sildenafil 100 mg oral tablet: 1 tab(s) orally once a day, As Needed (08 Mar 2022 09:57)  Vitamin B-12 1000 mcg oral tablet: 1 tab(s) orally once a day (08 Mar 2022 09:57)

## 2022-09-06 NOTE — PHARMACOTHERAPY INTERVENTION NOTE - COMMENTS
MD does not want heart rate parameters for digoxin. MD does not want heart rate parameters for digoxin. Also suggested digoxin level. MD does not want to add heart rate parameters for digoxin. Also suggested digoxin level.

## 2022-09-06 NOTE — ED ADULT TRIAGE NOTE - CHIEF COMPLAINT QUOTE
pt a&O x4 walk in from home c.o of vomited brown dark liquid last night, felt lightheaded, weak, blood in stool beginning yesterday. pt appears pale. states drinks beer and scotch.

## 2022-09-06 NOTE — ED ADULT NURSE NOTE - NSIMPLEMENTINTERV_GEN_ALL_ED
Implemented All Universal Safety Interventions:  [employer] to call system. Call bell, personal items and telephone within reach. Instruct patient to call for assistance. Room bathroom lighting operational. Non-slip footwear when patient is off stretcher. Physically safe environment: no spills, clutter or unnecessary equipment. Stretcher in lowest position, wheels locked, appropriate side rails in place.

## 2022-09-06 NOTE — ED PROVIDER NOTE - CLINICAL SUMMARY MEDICAL DECISION MAKING FREE TEXT BOX
DDx: GI bleed. R/o anemia. No vomiting to suggest esophagitis  Plan: cbc, cmp, type and screen, coags, uc, ua, cxr, ekg, Protonix, and admission.

## 2022-09-06 NOTE — H&P ADULT - NSHPPHYSICALEXAM_GEN_ALL_CORE
Physical exam:  General: patient in no acute distress, resting comfortably  Head:  Atraumatic, Normocephalic  Eyes: EOMI, PERRLA, clear sclera  Neck: Supple, thyroid nontender, non enlarged  Cardio: S1/S2 +ve, irregular rhythm,   Resp: clear to ausculation bilaterally, no rales or wheezes  GI: abdomen soft, nontender, non distended, no guarding, BS +ve x 4  Ext: no significant pedal edema  Neuro: CN 2-12 intact, no significant motor or sensory deficits.  Skin: No rashes or lesions

## 2022-09-06 NOTE — PATIENT PROFILE ADULT - FALL HARM RISK - UNIVERSAL INTERVENTIONS
Bed in lowest position, wheels locked, appropriate side rails in place/Call bell, personal items and telephone in reach/Instruct patient to call for assistance before getting out of bed or chair/Non-slip footwear when patient is out of bed/[place] to call system/Physically safe environment - no spills, clutter or unnecessary equipment/Purposeful Proactive Rounding/Room/bathroom lighting operational, light cord in reach

## 2022-09-06 NOTE — H&P ADULT - NSHPREVIEWOFSYSTEMS_GEN_ALL_CORE
Constitutional: no fever, chills, night sweats  Ears: no hearing changes or ear pain,   Nose: no nasal congestion, sinus pain, or rhinorrhea  Cardio: no chest pain, orthopnea, edema, or palpitations  Resp: no dyspnea, cough, wheezing  GI: no nausea, vomiting, diarrhea, constipation, hematochezia, or melena  : no dysuria, urinary frequency, hematuria  MSK: no back pain, neck pain  Skin: no rash, pruritis   Neuro: weakness, dizziness, lightheadedness positive.  No syncope   Heme/Lymph: no bruising or bleeding

## 2022-09-06 NOTE — ED ADULT NURSE REASSESSMENT NOTE - NS ED NURSE REASSESS COMMENT FT1
Patient in room smoking, patient made aware of his dangers of smoking in hospital room on oxygen. discharged from facility awaiting for his roommate to be home to let him inside the house. Nurse manager made aware. 2-3x/week

## 2022-09-07 LAB
APPEARANCE UR: CLEAR — SIGNIFICANT CHANGE UP
BACTERIA # UR AUTO: NEGATIVE — SIGNIFICANT CHANGE UP
BILIRUB UR-MCNC: NEGATIVE — SIGNIFICANT CHANGE UP
COLOR SPEC: YELLOW — SIGNIFICANT CHANGE UP
DIFF PNL FLD: NEGATIVE — SIGNIFICANT CHANGE UP
EPI CELLS # UR: SIGNIFICANT CHANGE UP
GLUCOSE UR QL: NEGATIVE MG/DL — SIGNIFICANT CHANGE UP
HCT VFR BLD CALC: 23.7 % — LOW (ref 39–50)
HCV AB S/CO SERPL IA: 0.09 S/CO — SIGNIFICANT CHANGE UP (ref 0–0.99)
HCV AB SERPL-IMP: SIGNIFICANT CHANGE UP
HGB BLD-MCNC: 7.9 G/DL — LOW (ref 13–17)
KETONES UR-MCNC: NEGATIVE — SIGNIFICANT CHANGE UP
LEUKOCYTE ESTERASE UR-ACNC: NEGATIVE — SIGNIFICANT CHANGE UP
MCHC RBC-ENTMCNC: 33.3 G/DL — SIGNIFICANT CHANGE UP (ref 32–36)
MCHC RBC-ENTMCNC: 36.9 PG — HIGH (ref 27–34)
MCV RBC AUTO: 110.7 FL — HIGH (ref 80–100)
NITRITE UR-MCNC: NEGATIVE — SIGNIFICANT CHANGE UP
NRBC # BLD: 0 /100 WBCS — SIGNIFICANT CHANGE UP (ref 0–0)
PH UR: 6 — SIGNIFICANT CHANGE UP (ref 5–8)
PLATELET # BLD AUTO: 134 K/UL — LOW (ref 150–400)
PROT UR-MCNC: 15 MG/DL
RBC # BLD: 2.14 M/UL — LOW (ref 4.2–5.8)
RBC # FLD: 12.8 % — SIGNIFICANT CHANGE UP (ref 10.3–14.5)
RBC CASTS # UR COMP ASSIST: SIGNIFICANT CHANGE UP /HPF (ref 0–4)
SP GR SPEC: 1.01 — SIGNIFICANT CHANGE UP (ref 1.01–1.02)
UROBILINOGEN FLD QL: NEGATIVE MG/DL — SIGNIFICANT CHANGE UP
WBC # BLD: 7.41 K/UL — SIGNIFICANT CHANGE UP (ref 3.8–10.5)
WBC # FLD AUTO: 7.41 K/UL — SIGNIFICANT CHANGE UP (ref 3.8–10.5)
WBC UR QL: SIGNIFICANT CHANGE UP

## 2022-09-07 PROCEDURE — 99233 SBSQ HOSP IP/OBS HIGH 50: CPT | Mod: FS

## 2022-09-07 RX ORDER — SODIUM CHLORIDE 9 MG/ML
1000 INJECTION, SOLUTION INTRAVENOUS
Refills: 0 | Status: DISCONTINUED | OUTPATIENT
Start: 2022-09-07 | End: 2022-09-09

## 2022-09-07 RX ORDER — SOD SULF/SODIUM/NAHCO3/KCL/PEG
4000 SOLUTION, RECONSTITUTED, ORAL ORAL ONCE
Refills: 0 | Status: COMPLETED | OUTPATIENT
Start: 2022-09-07 | End: 2022-09-07

## 2022-09-07 RX ORDER — PANTOPRAZOLE SODIUM 20 MG/1
40 TABLET, DELAYED RELEASE ORAL
Refills: 0 | Status: DISCONTINUED | OUTPATIENT
Start: 2022-09-07 | End: 2022-09-09

## 2022-09-07 RX ADMIN — Medication 10 MILLIGRAM(S): at 20:19

## 2022-09-07 RX ADMIN — PANTOPRAZOLE SODIUM 10 MG/HR: 20 TABLET, DELAYED RELEASE ORAL at 09:42

## 2022-09-07 RX ADMIN — PANTOPRAZOLE SODIUM 10 MG/HR: 20 TABLET, DELAYED RELEASE ORAL at 05:28

## 2022-09-07 RX ADMIN — Medication 250 MICROGRAM(S): at 05:30

## 2022-09-07 RX ADMIN — Medication 4000 MILLILITER(S): at 16:30

## 2022-09-07 RX ADMIN — SODIUM CHLORIDE 75 MILLILITER(S): 9 INJECTION, SOLUTION INTRAVENOUS at 10:19

## 2022-09-07 RX ADMIN — PANTOPRAZOLE SODIUM 40 MILLIGRAM(S): 20 TABLET, DELAYED RELEASE ORAL at 18:00

## 2022-09-07 NOTE — PROGRESS NOTE ADULT - ASSESSMENT
Patient is a 77M with a PMH of Afib on eliquis, HTN who presents to the ED for rectal bleeding.  Patient states that yesterday evening he developed abdominal discomfort which he thought was indigestion.  Later had a loose BM that noted to have a significant amount of bright red blood in it.  Patient states that in total he had 6 bloody BMs.  Bleeding continued into this morning.  Patient states that he tried to run an errand this morning but felt weak and lightheaded so he decided to present to the ED.  Patient currently has no active complaints.  Admits to smoking cigars daily but denies cigarette use.  Patient also reports drinking ~2 beers daily.  Last colonoscopy 4 years ago - no abnormal results.  BP initially low but improved with IV fluids admitted to medical for GIB pending EGD.     Problem/Plan - 1:  ·  Problem: GIB (gastrointestinal bleeding). Acute blood loss anemia  ·  Plan: Will make NPO.  GI consult - Rajeev notified  EGD planned for today   Protonix infusion. SCDs for prophyaxis.  (9/7) Plan to transfuse 1 u prbc goal >8.    Problem/Plan - 2:  ·  Problem: Chronic atrial fibrillation.   ·  Plan: holding eliquis  continue digoxin.    Problem/Plan - 3:  ·  Problem: Essential hypertension.   ·  Plan: metoprolol.   Patient is a 77M with a PMH of Afib on eliquis, HTN who presents to the ED for rectal bleeding.  Patient states that yesterday evening he developed abdominal discomfort which he thought was indigestion.  Later had a loose BM that noted to have a significant amount of bright red blood in it.  Patient states that in total he had 6 bloody BMs.  Bleeding continued into this morning.  Patient states that he tried to run an errand this morning but felt weak and lightheaded so he decided to present to the ED.  Patient currently has no active complaints.  Admits to smoking cigars daily but denies cigarette use.  Patient also reports drinking ~2 beers daily.  Last colonoscopy 4 years ago - no abnormal results.  BP initially low but improved with IV fluids admitted to medical for GIB pending EGD.     Problem/Plan - 1:  ·  Problem: GIB (gastrointestinal bleeding). Acute blood loss anemia  ·  Plan: Will make NPO.  GI consult - Rajeev notified  EGD planned for tomorrow  Protonix infusion. SCDs for prophyaxis.  (9/7) Plan to transfuse 1 u prbc goal >8.  NPO after MN  Clear liquid diet     Problem/Plan - 2:  ·  Problem: Chronic atrial fibrillation.   ·  Plan: holding eliquis  continue digoxin.    Problem/Plan - 3:  ·  Problem: Essential hypertension.   ·  Plan: metoprolol.

## 2022-09-07 NOTE — PROGRESS NOTE ADULT - SUBJECTIVE AND OBJECTIVE BOX
Patient is a 77y old  Male who presents with a chief complaint of GIB (06 Sep 2022 22:19)    INTERVAL HPI/OVERNIGHT EVENTS: Patients seen and examined at bedside this morning. No acute events overnight. Pt reports    MEDICATIONS  (STANDING):  digoxin     Tablet 250 MICROGram(s) Oral daily  influenza  Vaccine (HIGH DOSE) 0.7 milliLiter(s) IntraMuscular once  lactated ringers. 1000 milliLiter(s) (75 mL/Hr) IV Continuous <Continuous>  metoprolol tartrate 100 milliGRAM(s) Oral two times a day  pantoprazole Infusion 8 mG/Hr (10 mL/Hr) IV Continuous <Continuous>    MEDICATIONS  (PRN):    Allergies    No Known Allergies    Intolerances      REVIEW OF SYSTEMS:  All other systems reviewed and are negative    Vital Signs Last 24 Hrs  T(C): 36.2 (07 Sep 2022 11:35), Max: 36.8 (06 Sep 2022 20:32)  T(F): 97.2 (07 Sep 2022 11:35), Max: 98.2 (06 Sep 2022 20:32)  HR: 88 (07 Sep 2022 11:35) (82 - 107)  BP: 98/68 (07 Sep 2022 11:35) (87/57 - 116/73)  BP(mean): --  RR: 18 (07 Sep 2022 11:35) (14 - 18)  SpO2: 100% (07 Sep 2022 11:35) (97% - 100%)    Parameters below as of 07 Sep 2022 11:35  Patient On (Oxygen Delivery Method): room air      Daily Height in cm: 182.88 (06 Sep 2022 21:30)    Daily   I&O's Summary    06 Sep 2022 07:01  -  07 Sep 2022 07:00  --------------------------------------------------------  IN: 100 mL / OUT: 600 mL / NET: -500 mL      CAPILLARY BLOOD GLUCOSE        PHYSICAL EXAM:  GENERAL: NAD, well-groomed, well-developed  HEAD:  Atraumatic, Normocephalic  EYES: EOMI, PERRLA, pale conjunctiva and sclera clear  ENMT: No tonsillar erythema, exudates, or enlargement; Moist mucous membranes, Good dentition, No lesions  NECK: Supple, No JVD, Normal thyroid  NERVOUS SYSTEM:  Alert & Oriented X3, Good concentration; Motor Strength 5/5 B/L upper and lower extremities; DTRs 2+ intact and symmetric  CHEST/LUNG: Clear to percussion bilaterally; No rales, rhonchi, wheezing, or rubs  HEART: Regular rate and rhythm; No murmurs, rubs, or gallops  ABDOMEN: Soft, Nontender, Nondistended; Bowel sounds present  EXTREMITIES:  2+ Peripheral Pulses, No clubbing, cyanosis, or edema  LYMPH: No lymphadenopathy noted  SKIN: No rashes or lesions    Labs                          7.9    7.41  )-----------( 134      ( 07 Sep 2022 07:22 )             23.7     09-    142  |  106  |  61<H>  ----------------------------<  148<H>  4.3   |  29  |  1.00    Ca    9.2      06 Sep 2022 14:25    TPro  6.8  /  Alb  3.4  /  TBili  0.7  /  DBili  x   /  AST  12<L>  /  ALT  15  /  AlkPhos  47  09-06    PT/INR - ( 06 Sep 2022 14:25 )   PT: 16.0 sec;   INR: 1.34 ratio         PTT - ( 06 Sep 2022 14:25 )  PTT:25.7 sec      Urinalysis Basic - ( 07 Sep 2022 04:30 )    Color: Yellow / Appearance: Clear / S.015 / pH: x  Gluc: x / Ketone: Negative  / Bili: Negative / Urobili: Negative mg/dL   Blood: x / Protein: 15 mg/dL / Nitrite: Negative   Leuk Esterase: Negative / RBC: 0-2 /HPF / WBC 0-2   Sq Epi: x / Non Sq Epi: Occasional / Bacteria: Negative                   Patient is a 77y old  Male who presents with a chief complaint of GIB (06 Sep 2022 22:19)    INTERVAL HPI/OVERNIGHT EVENTS: Patients seen and examined at bedside this morning. No acute events overnight. Pt reports no more melena or BRBRR overnight. Denies abdominal pain.     MEDICATIONS  (STANDING):  digoxin     Tablet 250 MICROGram(s) Oral daily  influenza  Vaccine (HIGH DOSE) 0.7 milliLiter(s) IntraMuscular once  lactated ringers. 1000 milliLiter(s) (75 mL/Hr) IV Continuous <Continuous>  metoprolol tartrate 100 milliGRAM(s) Oral two times a day  pantoprazole Infusion 8 mG/Hr (10 mL/Hr) IV Continuous <Continuous>    MEDICATIONS  (PRN):    Allergies    No Known Allergies    Intolerances      REVIEW OF SYSTEMS:  All other systems reviewed and are negative    Vital Signs Last 24 Hrs  T(C): 36.2 (07 Sep 2022 11:35), Max: 36.8 (06 Sep 2022 20:32)  T(F): 97.2 (07 Sep 2022 11:35), Max: 98.2 (06 Sep 2022 20:32)  HR: 88 (07 Sep 2022 11:35) (82 - 107)  BP: 98/68 (07 Sep 2022 11:35) (87/57 - 116/73)  BP(mean): --  RR: 18 (07 Sep 2022 11:35) (14 - 18)  SpO2: 100% (07 Sep 2022 11:35) (97% - 100%)    Parameters below as of 07 Sep 2022 11:35  Patient On (Oxygen Delivery Method): room air      Daily Height in cm: 182.88 (06 Sep 2022 21:30)    Daily   I&O's Summary    06 Sep 2022 07:01  -  07 Sep 2022 07:00  --------------------------------------------------------  IN: 100 mL / OUT: 600 mL / NET: -500 mL      CAPILLARY BLOOD GLUCOSE        PHYSICAL EXAM:  GENERAL: NAD, well-groomed, well-developed  HEAD:  Atraumatic, Normocephalic  EYES: EOMI, PERRLA, pale conjunctiva and sclera clear  ENMT: No tonsillar erythema, exudates, or enlargement; Moist mucous membranes, Good dentition, No lesions  NECK: Supple, No JVD, Normal thyroid  NERVOUS SYSTEM:  Alert & Oriented X3, Good concentration; Motor Strength 5/5 B/L upper and lower extremities; DTRs 2+ intact and symmetric  CHEST/LUNG: Clear to percussion bilaterally; No rales, rhonchi, wheezing, or rubs  HEART: Regular rate and rhythm; No murmurs, rubs, or gallops  ABDOMEN: Soft, Nontender, Nondistended; Bowel sounds present  EXTREMITIES:  2+ Peripheral Pulses, No clubbing, cyanosis, or edema  LYMPH: No lymphadenopathy noted  SKIN: No rashes or lesions    Labs                          7.9    7.41  )-----------( 134      ( 07 Sep 2022 07:22 )             23.7         142  |  106  |  61<H>  ----------------------------<  148<H>  4.3   |  29  |  1.00    Ca    9.2      06 Sep 2022 14:25    TPro  6.8  /  Alb  3.4  /  TBili  0.7  /  DBili  x   /  AST  12<L>  /  ALT  15  /  AlkPhos  47  09-06    PT/INR - ( 06 Sep 2022 14:25 )   PT: 16.0 sec;   INR: 1.34 ratio         PTT - ( 06 Sep 2022 14:25 )  PTT:25.7 sec      Urinalysis Basic - ( 07 Sep 2022 04:30 )    Color: Yellow / Appearance: Clear / S.015 / pH: x  Gluc: x / Ketone: Negative  / Bili: Negative / Urobili: Negative mg/dL   Blood: x / Protein: 15 mg/dL / Nitrite: Negative   Leuk Esterase: Negative / RBC: 0-2 /HPF / WBC 0-2   Sq Epi: x / Non Sq Epi: Occasional / Bacteria: Negative

## 2022-09-07 NOTE — PROGRESS NOTE ADULT - SUBJECTIVE AND OBJECTIVE BOX
Pt stable - no bleeding today but had some bleeding overnight.  Last Hgb 7.9 - pt to get 2u prbc's  No endoscopy time available today - will schedule for both EGD and Colon tomorrow      MEDICATIONS  (STANDING):  bisacodyl 10 milliGRAM(s) Oral once  digoxin     Tablet 250 MICROGram(s) Oral daily  influenza  Vaccine (HIGH DOSE) 0.7 milliLiter(s) IntraMuscular once  lactated ringers. 1000 milliLiter(s) (75 mL/Hr) IV Continuous <Continuous>  metoprolol tartrate 100 milliGRAM(s) Oral two times a day  pantoprazole  Injectable 40 milliGRAM(s) IV Push two times a day  polyethylene glycol/electrolyte Solution. 4000 milliLiter(s) Oral once    MEDICATIONS  (PRN):      Allergies    No Known Allergies    Intolerances        Vital Signs Last 24 Hrs  T(C): 36.2 (07 Sep 2022 11:35), Max: 36.8 (06 Sep 2022 20:32)  T(F): 97.2 (07 Sep 2022 11:35), Max: 98.2 (06 Sep 2022 20:32)  HR: 88 (07 Sep 2022 11:35) (82 - 107)  BP: 98/68 (07 Sep 2022 11:35) (96/68 - 116/73)  BP(mean): --  RR: 18 (07 Sep 2022 11:35) (14 - 18)  SpO2: 100% (07 Sep 2022 11:35) (97% - 100%)    Parameters below as of 07 Sep 2022 11:35  Patient On (Oxygen Delivery Method): room air        PHYSICAL EXAM:  General: NAD.  CVS: S1, S2  Chest: air entry bilaterally present  Abd: BS present, soft, non-tender      LABS:                        7.9    7.41  )-----------( 134      ( 07 Sep 2022 07:22 )             23.7     09-06    142  |  106  |  61<H>  ----------------------------<  148<H>  4.3   |  29  |  1.00    Ca    9.2      06 Sep 2022 14:25    TPro  6.8  /  Alb  3.4  /  TBili  0.7  /  DBili  x   /  AST  12<L>  /  ALT  15  /  AlkPhos  47  09-06    PT/INR - ( 06 Sep 2022 14:25 )   PT: 16.0 sec;   INR: 1.34 ratio         PTT - ( 06 Sep 2022 14:25 )  PTT:25.7 sec    Transfuse 2nd unit PRBC's  CBC at 6pm and in AM  Colon prep ordered  NPO after MN  IV protonix

## 2022-09-07 NOTE — CHART NOTE - NSCHARTNOTEFT_GEN_A_CORE
House- Medicine NP:    Called by Angela ADAIR to obtain consent for blood transfusion.   Patient is a 77y old  Male who presents with a chief complaint of GIB (06 Sep 2022 22:19)                          7.9    7.41  )-----------( 134      ( 07 Sep 2022 07:22 )             23.7     Discussed with patient at bedside regarding the need for blood transfusion. Risk and benefits discussed. Risks including fever, chills/rigors, high or low blood pressure, respiratory distress (wheezing/hypoxia), urticaria/rash/edema, nausea, pain, bleeding, darkened urine, lower back pain, severe allergic reaction and death was discussed. Verbalizes the understanding and consent obtained. Witnessed by Angela ADAIR.

## 2022-09-08 ENCOUNTER — RESULT REVIEW (OUTPATIENT)
Age: 77
End: 2022-09-08

## 2022-09-08 LAB
ALBUMIN SERPL ELPH-MCNC: 3.1 G/DL — LOW (ref 3.3–5)
ALP SERPL-CCNC: 38 U/L — LOW (ref 40–120)
ALT FLD-CCNC: 11 U/L — LOW (ref 12–78)
ANION GAP SERPL CALC-SCNC: 5 MMOL/L — SIGNIFICANT CHANGE UP (ref 5–17)
AST SERPL-CCNC: 12 U/L — LOW (ref 15–37)
BILIRUB SERPL-MCNC: 1.1 MG/DL — SIGNIFICANT CHANGE UP (ref 0.2–1.2)
BUN SERPL-MCNC: 19 MG/DL — SIGNIFICANT CHANGE UP (ref 7–23)
CALCIUM SERPL-MCNC: 8.2 MG/DL — LOW (ref 8.5–10.1)
CHLORIDE SERPL-SCNC: 108 MMOL/L — SIGNIFICANT CHANGE UP (ref 96–108)
CO2 SERPL-SCNC: 30 MMOL/L — SIGNIFICANT CHANGE UP (ref 22–31)
CREAT SERPL-MCNC: 0.7 MG/DL — SIGNIFICANT CHANGE UP (ref 0.5–1.3)
CULTURE RESULTS: SIGNIFICANT CHANGE UP
EGFR: 95 ML/MIN/1.73M2 — SIGNIFICANT CHANGE UP
GLUCOSE SERPL-MCNC: 91 MG/DL — SIGNIFICANT CHANGE UP (ref 70–99)
HCT VFR BLD CALC: 28 % — LOW (ref 39–50)
HCT VFR BLD CALC: 29.5 % — LOW (ref 39–50)
HGB BLD-MCNC: 9.5 G/DL — LOW (ref 13–17)
HGB BLD-MCNC: 9.9 G/DL — LOW (ref 13–17)
INR BLD: 1.23 RATIO — HIGH (ref 0.88–1.16)
MAGNESIUM SERPL-MCNC: 1.8 MG/DL — SIGNIFICANT CHANGE UP (ref 1.6–2.6)
MCHC RBC-ENTMCNC: 33.4 PG — SIGNIFICANT CHANGE UP (ref 27–34)
MCHC RBC-ENTMCNC: 33.6 G/DL — SIGNIFICANT CHANGE UP (ref 32–36)
MCHC RBC-ENTMCNC: 33.8 PG — SIGNIFICANT CHANGE UP (ref 27–34)
MCHC RBC-ENTMCNC: 33.9 G/DL — SIGNIFICANT CHANGE UP (ref 32–36)
MCV RBC AUTO: 99.6 FL — SIGNIFICANT CHANGE UP (ref 80–100)
MCV RBC AUTO: 99.7 FL — SIGNIFICANT CHANGE UP (ref 80–100)
NRBC # BLD: 0 /100 WBCS — SIGNIFICANT CHANGE UP (ref 0–0)
NRBC # BLD: 0 /100 WBCS — SIGNIFICANT CHANGE UP (ref 0–0)
PHOSPHATE SERPL-MCNC: 2.6 MG/DL — SIGNIFICANT CHANGE UP (ref 2.5–4.5)
PLATELET # BLD AUTO: 127 K/UL — LOW (ref 150–400)
PLATELET # BLD AUTO: 131 K/UL — LOW (ref 150–400)
POTASSIUM SERPL-MCNC: 3.7 MMOL/L — SIGNIFICANT CHANGE UP (ref 3.5–5.3)
POTASSIUM SERPL-SCNC: 3.7 MMOL/L — SIGNIFICANT CHANGE UP (ref 3.5–5.3)
PROT SERPL-MCNC: 5.4 GM/DL — LOW (ref 6–8.3)
PROTHROM AB SERPL-ACNC: 14.7 SEC — HIGH (ref 10.5–13.4)
RBC # BLD: 2.81 M/UL — LOW (ref 4.2–5.8)
RBC # BLD: 2.96 M/UL — LOW (ref 4.2–5.8)
RBC # FLD: 21.3 % — HIGH (ref 10.3–14.5)
RBC # FLD: 21.8 % — HIGH (ref 10.3–14.5)
SODIUM SERPL-SCNC: 143 MMOL/L — SIGNIFICANT CHANGE UP (ref 135–145)
SPECIMEN SOURCE: SIGNIFICANT CHANGE UP
WBC # BLD: 5.55 K/UL — SIGNIFICANT CHANGE UP (ref 3.8–10.5)
WBC # BLD: 7.05 K/UL — SIGNIFICANT CHANGE UP (ref 3.8–10.5)
WBC # FLD AUTO: 5.55 K/UL — SIGNIFICANT CHANGE UP (ref 3.8–10.5)
WBC # FLD AUTO: 7.05 K/UL — SIGNIFICANT CHANGE UP (ref 3.8–10.5)

## 2022-09-08 PROCEDURE — 88305 TISSUE EXAM BY PATHOLOGIST: CPT | Mod: 26

## 2022-09-08 PROCEDURE — 88312 SPECIAL STAINS GROUP 1: CPT | Mod: 26

## 2022-09-08 PROCEDURE — 99233 SBSQ HOSP IP/OBS HIGH 50: CPT | Mod: FS

## 2022-09-08 RX ORDER — METOPROLOL TARTRATE 50 MG
5 TABLET ORAL EVERY 6 HOURS
Refills: 0 | Status: DISCONTINUED | OUTPATIENT
Start: 2022-09-08 | End: 2022-09-09

## 2022-09-08 RX ADMIN — PANTOPRAZOLE SODIUM 40 MILLIGRAM(S): 20 TABLET, DELAYED RELEASE ORAL at 17:20

## 2022-09-08 RX ADMIN — Medication 250 MICROGRAM(S): at 06:46

## 2022-09-08 RX ADMIN — Medication 5 MILLIGRAM(S): at 17:20

## 2022-09-08 RX ADMIN — SODIUM CHLORIDE 75 MILLILITER(S): 9 INJECTION, SOLUTION INTRAVENOUS at 07:02

## 2022-09-08 RX ADMIN — PANTOPRAZOLE SODIUM 40 MILLIGRAM(S): 20 TABLET, DELAYED RELEASE ORAL at 06:46

## 2022-09-08 NOTE — PROGRESS NOTE ADULT - ASSESSMENT
Patient is a 77M with a PMH of Afib on eliquis, HTN who presents to the ED for rectal bleeding.  Patient states that yesterday evening he developed abdominal discomfort which he thought was indigestion.  Later had a loose BM that noted to have a significant amount of bright red blood in it.  Patient states that in total he had 6 bloody BMs.  Bleeding continued into this morning.  Patient states that he tried to run an errand this morning but felt weak and lightheaded so he decided to present to the ED.  Patient currently has no active complaints.  Admits to smoking cigars daily but denies cigarette use.  Patient also reports drinking ~2 beers daily.  Last colonoscopy 4 years ago - no abnormal results.  BP initially low but improved with IV fluids admitted to medical for GIB pending EGD.     Problem/Plan - 1:  ·  Problem: GIB (gastrointestinal bleeding). Acute blood loss anemia  ·  Plan: Will make NPO.  GI consult - Rajeev notified  EGD planned for tomorrow  Protonix infusion. SCDs for prophyaxis.  (9/7) Plan to transfuse 1 u prbc goal >8.  (9/8) NPO currently for EGD and colonoscopy       Problem/Plan - 2:  ·  Problem: Chronic atrial fibrillation.   ·  Plan: holding eliquis  continue digoxin.  metoprolol IV     Problem/Plan - 3:  ·  Problem: Essential hypertension.   ·  Plan: metoprolol IV

## 2022-09-08 NOTE — PROGRESS NOTE ADULT - SUBJECTIVE AND OBJECTIVE BOX
Patient is a 77y old  Male who presents with a chief complaint of GIB (07 Sep 2022 14:39)    INTERVAL HPI/OVERNIGHT EVENTS: Patients seen and examined at bedside this morning. No acute events overnight. Pt reports no further BM today. Denies abdominal pain.    MEDICATIONS  (STANDING):  digoxin     Tablet 250 MICROGram(s) Oral daily  influenza  Vaccine (HIGH DOSE) 0.7 milliLiter(s) IntraMuscular once  lactated ringers. 1000 milliLiter(s) (75 mL/Hr) IV Continuous <Continuous>  metoprolol tartrate 100 milliGRAM(s) Oral two times a day  metoprolol tartrate Injectable 5 milliGRAM(s) IV Push every 6 hours  pantoprazole  Injectable 40 milliGRAM(s) IV Push two times a day    MEDICATIONS  (PRN):    Allergies    No Known Allergies    Intolerances      REVIEW OF SYSTEMS:  All other systems reviewed and are negative    Vital Signs Last 24 Hrs  T(C): 36.4 (08 Sep 2022 11:17), Max: 36.6 (07 Sep 2022 14:50)  T(F): 97.5 (08 Sep 2022 11:17), Max: 97.9 (07 Sep 2022 16:40)  HR: 80 (08 Sep 2022 11:17) (77 - 103)  BP: 109/73 (08 Sep 2022 11:17) (99/64 - 143/74)  BP(mean): --  RR: 18 (08 Sep 2022 11:17) (17 - 18)  SpO2: 100% (08 Sep 2022 11:17) (97% - 100%)    Parameters below as of 08 Sep 2022 11:17  Patient On (Oxygen Delivery Method): room air      Daily     Daily   I&O's Summary    07 Sep 2022 07:01  -  08 Sep 2022 07:00  --------------------------------------------------------  IN: 350 mL / OUT: 400 mL / NET: -50 mL    08 Sep 2022 07:01  -  08 Sep 2022 12:58  --------------------------------------------------------  IN: 0 mL / OUT: 300 mL / NET: -300 mL      CAPILLARY BLOOD GLUCOSE        PHYSICAL EXAM:  GENERAL: NAD, well-groomed, well-developed, pale appearing  HEAD:  Atraumatic, Normocephalic  EYES: EOMI, PERRLA, pale conjunctiva and sclera clear  ENMT: No tonsillar erythema, exudates, or enlargement; Moist mucous membranes, Good dentition, No lesions  NECK: Supple, No JVD, Normal thyroid  NERVOUS SYSTEM:  Alert & Oriented X3, Good concentration; Motor Strength 5/5 B/L upper and lower extremities; DTRs 2+ intact and symmetric  CHEST/LUNG: Clear to percussion bilaterally; No rales, rhonchi, wheezing, or rubs  HEART: irregular rate and rhythm; No murmurs, rubs, or gallops  ABDOMEN: Soft, Nontender, Nondistended; Bowel sounds present  EXTREMITIES:  2+ Peripheral Pulses, No clubbing, cyanosis, or edema  LYMPH: No lymphadenopathy noted  SKIN: No rashes or lesions    Labs                          9.5    5.55  )-----------( 127      ( 08 Sep 2022 06:12 )             28.0     09-08    143  |  108  |  19  ----------------------------<  91  3.7   |  30  |  0.70    Ca    8.2<L>      08 Sep 2022 06:12  Phos  2.6     09-08  Mg     1.8     -08    TPro  5.4<L>  /  Alb  3.1<L>  /  TBili  1.1  /  DBili  x   /  AST  12<L>  /  ALT  11<L>  /  AlkPhos  38<L>  09-08    PT/INR - ( 08 Sep 2022 06:12 )   PT: 14.7 sec;   INR: 1.23 ratio         PTT - ( 06 Sep 2022 14:25 )  PTT:25.7 sec      Urinalysis Basic - ( 07 Sep 2022 04:30 )    Color: Yellow / Appearance: Clear / S.015 / pH: x  Gluc: x / Ketone: Negative  / Bili: Negative / Urobili: Negative mg/dL   Blood: x / Protein: 15 mg/dL / Nitrite: Negative   Leuk Esterase: Negative / RBC: 0-2 /HPF / WBC 0-2   Sq Epi: x / Non Sq Epi: Occasional / Bacteria: Negative        Culture - Urine (collected 07 Sep 2022 04:30)  Source: Clean Catch Clean Catch (Midstream)  Final Report (08 Sep 2022 08:53):    <10,000 CFU/mL Normal Urogenital Kathrine

## 2022-09-08 NOTE — PROGRESS NOTE ADULT - SUBJECTIVE AND OBJECTIVE BOX
see gastroscopy report    Imp: Acute Duodenal Ulcer (bulb); Gastritis    Plan: check path; IV protonix; CBC in AM; clear liquids; for colonoscopy now

## 2022-09-08 NOTE — PROGRESS NOTE ADULT - SUBJECTIVE AND OBJECTIVE BOX
see colonoscopy report    Imp: GI Bleed; Colon Polyps; Internal Hemorrhoids - Bleeding came from Acute Duodenal Ulcer    Plan: clear liquids; CBC in Am

## 2022-09-09 ENCOUNTER — TRANSCRIPTION ENCOUNTER (OUTPATIENT)
Age: 77
End: 2022-09-09

## 2022-09-09 LAB
HCT VFR BLD CALC: 29.9 % — LOW (ref 39–50)
HGB BLD-MCNC: 10 G/DL — LOW (ref 13–17)
MCHC RBC-ENTMCNC: 33.4 G/DL — SIGNIFICANT CHANGE UP (ref 32–36)
MCHC RBC-ENTMCNC: 33.8 PG — SIGNIFICANT CHANGE UP (ref 27–34)
MCV RBC AUTO: 101 FL — HIGH (ref 80–100)
NRBC # BLD: 0 /100 WBCS — SIGNIFICANT CHANGE UP (ref 0–0)
PLATELET # BLD AUTO: 146 K/UL — LOW (ref 150–400)
RBC # BLD: 2.96 M/UL — LOW (ref 4.2–5.8)
RBC # FLD: 21.1 % — HIGH (ref 10.3–14.5)
WBC # BLD: 6.22 K/UL — SIGNIFICANT CHANGE UP (ref 3.8–10.5)
WBC # FLD AUTO: 6.22 K/UL — SIGNIFICANT CHANGE UP (ref 3.8–10.5)

## 2022-09-09 PROCEDURE — 99233 SBSQ HOSP IP/OBS HIGH 50: CPT

## 2022-09-09 RX ORDER — PANTOPRAZOLE SODIUM 20 MG/1
1 TABLET, DELAYED RELEASE ORAL
Qty: 60 | Refills: 0
Start: 2022-09-09 | End: 2022-10-08

## 2022-09-09 RX ORDER — PREGABALIN 225 MG/1
1 CAPSULE ORAL
Qty: 0 | Refills: 0 | DISCHARGE

## 2022-09-09 RX ORDER — PANTOPRAZOLE SODIUM 20 MG/1
40 TABLET, DELAYED RELEASE ORAL
Refills: 0 | Status: DISCONTINUED | OUTPATIENT
Start: 2022-09-09 | End: 2022-09-10

## 2022-09-09 RX ORDER — APIXABAN 2.5 MG/1
1 TABLET, FILM COATED ORAL
Qty: 0 | Refills: 0 | DISCHARGE

## 2022-09-09 RX ADMIN — Medication 5 MILLIGRAM(S): at 12:35

## 2022-09-09 RX ADMIN — Medication 250 MICROGRAM(S): at 05:40

## 2022-09-09 RX ADMIN — PANTOPRAZOLE SODIUM 40 MILLIGRAM(S): 20 TABLET, DELAYED RELEASE ORAL at 17:57

## 2022-09-09 RX ADMIN — PANTOPRAZOLE SODIUM 40 MILLIGRAM(S): 20 TABLET, DELAYED RELEASE ORAL at 05:40

## 2022-09-09 NOTE — PROGRESS NOTE ADULT - SUBJECTIVE AND OBJECTIVE BOX
Pt stable - feels better  No complaints  +acute DU      MEDICATIONS  (STANDING):  digoxin     Tablet 250 MICROGram(s) Oral daily  influenza  Vaccine (HIGH DOSE) 0.7 milliLiter(s) IntraMuscular once  lactated ringers. 1000 milliLiter(s) (75 mL/Hr) IV Continuous <Continuous>  metoprolol tartrate 100 milliGRAM(s) Oral two times a day  metoprolol tartrate Injectable 5 milliGRAM(s) IV Push every 6 hours  pantoprazole  Injectable 40 milliGRAM(s) IV Push two times a day    MEDICATIONS  (PRN):      Allergies    No Known Allergies    Intolerances        Vital Signs Last 24 Hrs  T(C): 36.7 (09 Sep 2022 11:16), Max: 36.8 (08 Sep 2022 15:06)  T(F): 98 (09 Sep 2022 11:16), Max: 98.3 (08 Sep 2022 15:06)  HR: 82 (09 Sep 2022 11:16) (74 - 93)  BP: 111/71 (09 Sep 2022 11:16) (101/66 - 112/61)  BP(mean): --  RR: 17 (09 Sep 2022 11:16) (12 - 20)  SpO2: 95% (09 Sep 2022 11:16) (95% - 100%)    Parameters below as of 09 Sep 2022 11:16  Patient On (Oxygen Delivery Method): room air        PHYSICAL EXAM:  General: NAD.  CVS: S1, S2  Chest: air entry bilaterally present  Abd: BS present, soft, non-tender      LABS:                        10.0   6.22  )-----------( 146      ( 09 Sep 2022 06:19 )             29.9     09-08    143  |  108  |  19  ----------------------------<  91  3.7   |  30  |  0.70    Ca    8.2<L>      08 Sep 2022 06:12  Phos  2.6     09-08  Mg     1.8     09-08    TPro  5.4<L>  /  Alb  3.1<L>  /  TBili  1.1  /  DBili  x   /  AST  12<L>  /  ALT  11<L>  /  AlkPhos  38<L>  09-08    PT/INR - ( 08 Sep 2022 06:12 )   PT: 14.7 sec;   INR: 1.23 ratio           advance diet  Hgb stable  d/c planning  change protonix to 40mg PO BID

## 2022-09-09 NOTE — DISCHARGE NOTE PROVIDER - ATTENDING DISCHARGE PHYSICAL EXAMINATION:
GENERAL: NAD, well-groomed, well-developed, pale appearing  HEAD:  Atraumatic, Normocephalic  EYES: EOMI, PERRLA, pale conjunctiva and sclera clear  ENMT: No tonsillar erythema, exudates, or enlargement; Moist mucous membranes, Good dentition, No lesions  NECK: Supple, No JVD, Normal thyroid  NERVOUS SYSTEM:  Alert & Oriented X3, Good concentration; Motor Strength 5/5 B/L upper and lower extremities; DTRs 2+ intact and symmetric  CHEST/LUNG: Clear to percussion bilaterally; No rales, rhonchi, wheezing, or rubs  HEART: irregular rate and rhythm; No murmurs, rubs, or gallops  ABDOMEN: Soft, Nontender, Nondistended; Bowel sounds present  EXTREMITIES:  2+ Peripheral Pulses, No clubbing, cyanosis, or edema  LYMPH: No lymphadenopathy noted  SKIN: No rashes or lesions

## 2022-09-09 NOTE — DISCHARGE NOTE PROVIDER - NSDCMRMEDTOKEN_GEN_ALL_CORE_FT
apixaban 5 mg oral tablet: 1 tab(s) orally 2 times a day  digoxin 250 mcg (0.25 mg) oral tablet: 1  orally once a day (at bedtime)  metoprolol tartrate 100 mg oral tablet: 1.5  orally 2 times a day  pantoprazole 40 mg oral delayed release tablet: 1 tab(s) orally 2 times a day

## 2022-09-09 NOTE — PROGRESS NOTE ADULT - ASSESSMENT
Patient is a 77M with a PMH of Afib on eliquis, HTN who presents to the ED for rectal bleeding.  Patient states that yesterday evening he developed abdominal discomfort which he thought was indigestion.  Later had a loose BM that noted to have a significant amount of bright red blood in it.  Patient states that in total he had 6 bloody BMs.  Bleeding continued into this morning.  Patient states that he tried to run an errand this morning but felt weak and lightheaded so he decided to present to the ED.  Patient currently has no active complaints.  Admits to smoking cigars daily but denies cigarette use.  Patient also reports drinking ~2 beers daily.  Last colonoscopy 4 years ago - no abnormal results.  BP initially low but improved with IV fluids admitted to medical for GIB s/p EGD showed duodonal ulcer stable pending PO intake.     Problem/Plan - 1:  ·  Problem: GIB (gastrointestinal bleeding). Acute blood loss anemia  ·  Plan: Will make NPO.  GI consult - [place] notified  EGD planned  Protonix infusion. SCDs for prophyaxis.  (9/7) Plan to transfuse 1 u prbc goal >8.  (9/8) NPO currently for EGD and colonoscopy   (9/9) Tolerated clear diet. Will advance EGD showed duodenol ulcer. Biopsy results to be followed as outpatient.       Problem/Plan - 2:  ·  Problem: Chronic atrial fibrillation.   ·  Plan: restart elliquis   continue digoxin.  metoprolol    Problem/Plan - 3:  ·  Problem: Essential hypertension.   ·  Plan: metoprolol IV transitioned to home medications

## 2022-09-09 NOTE — DISCHARGE NOTE PROVIDER - HOSPITAL COURSE
Patient is a 77M with a PMH of Afib on eliquis, HTN who presents to the ED for rectal bleeding.  Patient states that yesterday evening he developed abdominal discomfort which he thought was indigestion.  Later had a loose BM that noted to have a significant amount of bright red blood in it.  Patient states that in total he had 6 bloody BMs.  Bleeding continued into this morning.  Patient states that he tried to run an errand this morning but felt weak and lightheaded so he decided to present to the ED.  Patient currently has no active complaints.  Admits to smoking cigars daily but denies cigarette use.  Patient also reports drinking ~2 beers daily.  Last colonoscopy 4 years ago - no abnormal results.  BP initially low but improved with IV fluids admitted to medical for GIB s/p EGD showed duodonal ulcer. Patient is medically stable for discharge home with outpatient follow up.     Problem/Plan - 1:  ·  Problem: GIB (gastrointestinal bleeding). Acute blood loss anemia  ·  Plan: Will make NPO.  GI consult - [place] notified  EGD planned  Protonix infusion. SCDs for prophyaxis.  (9/7) Plan to transfuse 1 u prbc goal >8.  (9/8) NPO currently for EGD and colonoscopy   (9/9) Tolerated diet. EGD showed duodenol ulcer. Biopsy results to be followed as outpatient.       Problem/Plan - 2:  ·  Problem: Chronic atrial fibrillation.   ·  Plan: restart elliquis   continue digoxin.  metoprolol    Problem/Plan - 3:  ·  Problem: Essential hypertension.   ·  Plan: metoprolol IV transitioned to home medications

## 2022-09-09 NOTE — PROGRESS NOTE ADULT - SUBJECTIVE AND OBJECTIVE BOX
Patient is a 77y old  Male who presents with a chief complaint of GIB (09 Sep 2022 11:59)    INTERVAL HPI/OVERNIGHT EVENTS: Patients seen and examined at bedside this morning. No acute events overnight. Pt reports tolerating only clears.    MEDICATIONS  (STANDING):  digoxin     Tablet 250 MICROGram(s) Oral daily  influenza  Vaccine (HIGH DOSE) 0.7 milliLiter(s) IntraMuscular once  lactated ringers. 1000 milliLiter(s) (75 mL/Hr) IV Continuous <Continuous>  metoprolol tartrate 100 milliGRAM(s) Oral two times a day  metoprolol tartrate Injectable 5 milliGRAM(s) IV Push every 6 hours  pantoprazole    Tablet 40 milliGRAM(s) Oral two times a day    MEDICATIONS  (PRN):    Allergies    No Known Allergies    Intolerances      REVIEW OF SYSTEMS:  All other systems reviewed and are negative    Vital Signs Last 24 Hrs  T(C): 36.7 (09 Sep 2022 11:16), Max: 36.7 (09 Sep 2022 11:16)  T(F): 98 (09 Sep 2022 11:16), Max: 98 (09 Sep 2022 11:16)  HR: 82 (09 Sep 2022 11:16) (74 - 92)  BP: 111/71 (09 Sep 2022 11:16) (101/66 - 111/71)  BP(mean): --  RR: 17 (09 Sep 2022 11:16) (17 - 18)  SpO2: 95% (09 Sep 2022 11:16) (95% - 100%)    Parameters below as of 09 Sep 2022 11:16  Patient On (Oxygen Delivery Method): room air      Daily     Daily   I&O's Summary    08 Sep 2022 07:01  -  09 Sep 2022 07:00  --------------------------------------------------------  IN: 950 mL / OUT: 1300 mL / NET: -350 mL      CAPILLARY BLOOD GLUCOSE        PHYSICAL EXAM:  GENERAL: NAD, well-groomed, well-developed, pale   HEAD:  Atraumatic, Normocephalic  EYES: EOMI, PERRLA, conjunctiva and sclera clear  ENMT: No tonsillar erythema, exudates, or enlargement; Moist mucous membranes, Good dentition, No lesions  NECK: Supple, No JVD, Normal thyroid  NERVOUS SYSTEM:  Alert & Oriented X3, Good concentration; Motor Strength 5/5 B/L upper and lower extremities; DTRs 2+ intact and symmetric  CHEST/LUNG: Clear to percussion bilaterally; No rales, rhonchi, wheezing, or rubs  HEART: Regular rate and rhythm; No murmurs, rubs, or gallops  ABDOMEN: Soft, Nontender, Nondistended; Bowel sounds present  EXTREMITIES:  2+ Peripheral Pulses, No clubbing, cyanosis, or edema  LYMPH: No lymphadenopathy noted  SKIN: No rashes or lesions    Labs                          10.0   6.22  )-----------( 146      ( 09 Sep 2022 06:19 )             29.9     09-08    143  |  108  |  19  ----------------------------<  91  3.7   |  30  |  0.70    Ca    8.2<L>      08 Sep 2022 06:12  Phos  2.6     09-08  Mg     1.8     09-08    TPro  5.4<L>  /  Alb  3.1<L>  /  TBili  1.1  /  DBili  x   /  AST  12<L>  /  ALT  11<L>  /  AlkPhos  38<L>  09-08    PT/INR - ( 08 Sep 2022 06:12 )   PT: 14.7 sec;   INR: 1.23 ratio                   Culture - Urine (collected 07 Sep 2022 04:30)  Source: Clean Catch Clean Catch (Midstream)  Final Report (08 Sep 2022 08:53):    <10,000 CFU/mL Normal Urogenital Kathrine

## 2022-09-09 NOTE — PROGRESS NOTE ADULT - PROVIDER SPECIALTY LIST ADULT
Gastroenterology
Gastroenterology
Hospitalist
Gastroenterology
Gastroenterology
Hospitalist
Hospitalist

## 2022-09-10 ENCOUNTER — TRANSCRIPTION ENCOUNTER (OUTPATIENT)
Age: 77
End: 2022-09-10

## 2022-09-10 VITALS
OXYGEN SATURATION: 98 % | SYSTOLIC BLOOD PRESSURE: 112 MMHG | DIASTOLIC BLOOD PRESSURE: 68 MMHG | RESPIRATION RATE: 16 BRPM | HEART RATE: 86 BPM | TEMPERATURE: 98 F

## 2022-09-10 PROCEDURE — 73562 X-RAY EXAM OF KNEE 3: CPT | Mod: 26,RT

## 2022-09-10 PROCEDURE — 99239 HOSP IP/OBS DSCHRG MGMT >30: CPT

## 2022-09-10 RX ORDER — APIXABAN 2.5 MG/1
5 TABLET, FILM COATED ORAL
Refills: 0 | Status: DISCONTINUED | OUTPATIENT
Start: 2022-09-10 | End: 2022-09-10

## 2022-09-10 RX ADMIN — Medication 250 MICROGRAM(S): at 05:43

## 2022-09-10 RX ADMIN — PANTOPRAZOLE SODIUM 40 MILLIGRAM(S): 20 TABLET, DELAYED RELEASE ORAL at 05:43

## 2022-09-10 RX ADMIN — APIXABAN 5 MILLIGRAM(S): 2.5 TABLET, FILM COATED ORAL at 09:13

## 2022-09-10 RX ADMIN — Medication 100 MILLIGRAM(S): at 05:43

## 2022-09-10 NOTE — DISCHARGE NOTE NURSING/CASE MANAGEMENT/SOCIAL WORK - PATIENT PORTAL LINK FT
You can access the FollowMyHealth Patient Portal offered by [facility] by registering at the following website: [email or web address]. By joining [facility]’s FollowMyHealth portal, you will also be able to view your health information using other applications (apps) compatible with our system.

## 2022-09-10 NOTE — DISCHARGE NOTE NURSING/CASE MANAGEMENT/SOCIAL WORK - NSDCPEFALRISK_GEN_ALL_CORE
For information on Fall & Injury Prevention, visit: [email or web address] OR  [email or web address] OR  https://www.cdc.gov/steadi/patient.html

## 2022-09-10 NOTE — DISCHARGE NOTE NURSING/CASE MANAGEMENT/SOCIAL WORK - NSDCPNINST_GEN_ALL_CORE
Pt verbalized understanding of discharge instructions, medications and scheduling follow up appointments with MD. Pt is knowledgeable of s/s of complications to report to MD, return to ER.

## 2022-09-12 LAB
SURGICAL PATHOLOGY STUDY: SIGNIFICANT CHANGE UP
SURGICAL PATHOLOGY STUDY: SIGNIFICANT CHANGE UP

## 2022-09-14 DIAGNOSIS — M48.00 SPINAL STENOSIS, SITE UNSPECIFIED: ICD-10-CM

## 2022-09-14 DIAGNOSIS — D62 ACUTE POSTHEMORRHAGIC ANEMIA: ICD-10-CM

## 2022-09-14 DIAGNOSIS — K64.8 OTHER HEMORRHOIDS: ICD-10-CM

## 2022-09-14 DIAGNOSIS — K26.0 ACUTE DUODENAL ULCER WITH HEMORRHAGE: ICD-10-CM

## 2022-09-14 DIAGNOSIS — I10 ESSENTIAL (PRIMARY) HYPERTENSION: ICD-10-CM

## 2022-09-14 DIAGNOSIS — F17.290 NICOTINE DEPENDENCE, OTHER TOBACCO PRODUCT, UNCOMPLICATED: ICD-10-CM

## 2022-09-14 DIAGNOSIS — K29.70 GASTRITIS, UNSPECIFIED, WITHOUT BLEEDING: ICD-10-CM

## 2022-09-14 DIAGNOSIS — Z79.01 LONG TERM (CURRENT) USE OF ANTICOAGULANTS: ICD-10-CM

## 2022-09-14 DIAGNOSIS — Z79.82 LONG TERM (CURRENT) USE OF ASPIRIN: ICD-10-CM

## 2022-09-14 DIAGNOSIS — K63.5 POLYP OF COLON: ICD-10-CM

## 2022-09-14 DIAGNOSIS — I48.20 CHRONIC ATRIAL FIBRILLATION, UNSPECIFIED: ICD-10-CM

## 2022-10-31 ENCOUNTER — APPOINTMENT (OUTPATIENT)
Dept: ORTHOPEDIC SURGERY | Facility: CLINIC | Age: 77
End: 2022-10-31

## 2022-10-31 VITALS — BODY MASS INDEX: 23.03 KG/M2 | WEIGHT: 170 LBS | HEIGHT: 72 IN

## 2022-10-31 DIAGNOSIS — M16.12 UNILATERAL PRIMARY OSTEOARTHRITIS, LEFT HIP: ICD-10-CM

## 2022-10-31 DIAGNOSIS — Z96.642 PRESENCE OF LEFT ARTIFICIAL HIP JOINT: ICD-10-CM

## 2022-10-31 PROCEDURE — 73502 X-RAY EXAM HIP UNI 2-3 VIEWS: CPT

## 2022-10-31 PROCEDURE — 99213 OFFICE O/P EST LOW 20 MIN: CPT

## 2022-10-31 NOTE — HISTORY OF PRESENT ILLNESS
[de-identified] : This is very nice 77-year-old gentleman here for interim evaluation of left total hip arthroplasty. The patient reports good pain relief since surgery in the replaced joint and satisfactory restoration of function in terms of activities of daily living. The patient no longer requires an assistive device for ambulation, does not require pain medication, and completed postoperative physical therapy. They report unlimited activities of daily living and unlimited walking tolerance. The patient is thrilled with their progress from surgery and ultimate outcome. \par

## 2022-10-31 NOTE — PHYSICAL EXAM
[de-identified] : Patient is well nourished, well-developed, in no acute distress, with appropriate mood and affect. The patient is oriented to time, place, and person. Respirations are even and unlabored. Gait evaluation does not reveal a limp. There is no inguinal adenopathy. Examination of the contralateral hip shows normal range of motion, strength, no tenderness, and intact skin. The affected limb is well-perfused and showed 2+ dp/pt pulses, without skin lesions, shows a grossly normal motor and sensory examination. Examination of the hip shows a well healed surgical scar. Hip motion is full and painless from 0-90 degrees extension to flexion, 20 degrees adduction and 20 degrees abduction, and 15 degrees internal and 30 degrees external rotation. Leg lengths are approximately equal. Both hips are stable and muscle strength is normal with good strength with resisted abduction and adduction. Pedal pulses are palpable. [de-identified] : AP pelvis, AP hip, and lateral x-rays of the left hip were ordered and obtained in the office and demonstrate satisfactory position and alignment of the components are present. No signs of loosening are seen.

## 2022-10-31 NOTE — DISCUSSION/SUMMARY
[de-identified] : The patient is doing well after joint replacement surgery.  Continue weight-bear as tolerated.  Daily home exercise program is recommended.  Follow-up is recommended for long-term monitoring in 1 year.

## 2023-05-15 ENCOUNTER — APPOINTMENT (OUTPATIENT)
Dept: ORTHOPEDIC SURGERY | Facility: CLINIC | Age: 78
End: 2023-05-15
Payer: MEDICARE

## 2023-05-15 VITALS — HEIGHT: 72 IN | BODY MASS INDEX: 23.03 KG/M2 | WEIGHT: 170 LBS

## 2023-05-15 DIAGNOSIS — Q76.2 CONGENITAL SPONDYLOLISTHESIS: ICD-10-CM

## 2023-05-15 PROCEDURE — 99214 OFFICE O/P EST MOD 30 MIN: CPT

## 2023-05-15 PROCEDURE — 72100 X-RAY EXAM L-S SPINE 2/3 VWS: CPT

## 2023-07-17 NOTE — DISCHARGE NOTE NURSING/CASE MANAGEMENT/SOCIAL WORK - NSDPACMPNY_GEN_ALL_CORE
Future    Screening PSA (prostate specific antigen)  -     PSA Screening; Future    Smoker unmotivated to quit    Primary osteoarthritis involving multiple joints    OARRS report checked    Proceed with laboratory testing. Adjustments of medication will be done after laboratory testing results available. Patient received counseling on the following healthy behaviors: nutrition and exercise     Patient given educational materials     Health maintenance updated    Discussed use, benefit, and side effects of prescribed medications. Barriers to medication compliance addressed. All patient questions answered. Pt voiced understanding. Patient needs RTC in 3 months. Medical decision making of moderate complexity. Please note that this chart was generated using Dragon dictation software. Although every effort was made to ensure the accuracy of this automated transcription, some errors in transcription may have occurred. Patient's complete Health Risk Assessment and screening values have been reviewed and are found in Flowsheets. The following problems were reviewed today and where indicated follow up appointments were made and/or referrals ordered. Positive Risk Factor Screenings with Interventions:                Opioid Risk: (Low risk score <55) Opioid risk score: 15    Patient is low risk for opioid use disorder or overdose. Last PDMP Madhav Points as Reviewed:  Review User Review Instant Review Result   Belkys Barone 4/25/2023  1:06 PM     Reviewed PDMP [1]     Last Controlled Substance Monitoring Documentation      [facility] Office Visit from 4/9/2019 in [address] [place] [address] The Prescription Monitoring Report for this patient was reviewed today.  filed at 04/09/2019 8308                Weight and Activity:  Physical Activity: Inactive    Days of Exercise per Week: 0 days    Minutes of Exercise per Session: 0 min     On average, how many days per week do you engage in
Family

## 2023-09-21 ASSESSMENT — HOOS JR
IMPORTED FORM: YES
BENDING TO THE FLOOR TO PICK UP OBJECT: MILD
GOING UP OR DOWN STAIRS: MODERATE
IMPORTED HOOS JR SCORE: 5.0
RISING FROM SITTING: MILD
WALKING ON UNEVEN SURFACE: MILD
HOOS JR RAW SCORE: 5

## 2023-10-13 NOTE — PATIENT PROFILE ADULT - NSASFALLATTEMPTOOB_GEN_A_NUR
Thanks for letting us take care of you today!  It is our goal to give you courteous care and to keep you comfortable and informed, if you have any questions before you leave I will be happy to try and answer them.    Here is some advice after your visit:      Your visit in the emergency department is NOT definitive care - please follow-up with your primary care doctor and/or specialist within 1-2 days.  Please return if you have any worsening in your condition or if you have any other concerns.    If you had radiology exams like an XRAY or CT in the emergency Department the interpreation on them may be preliminary - there may be less time sensitive findings on the reports please obtain these reports within 24 hours from the hospital or by using your out on your mobile phone to access records.  Bring these to your primary care doctor and/or specialist for further review of incidental findings.    Please review any LAB WORK from your visit today with your primary care physician.    If you were prescribed OPIATE PAIN MEDICATION - please understand of these medications can be addictive, you may fill less of the prescription was written for, you do not have to take the full prescription.  You may discard what you do not use.  Please seek help if you feel you are having problems with addiction.  Do not drive or operate heavy machinery if you are taking sedating medications.  Do not mix these medications with alcohol.      If you had a SPLINT placed in the emergency department if you have severe pain numbness tingling or discoloration of year digits please remove the splint and return to the emergency department for further evaluation as this may represent a sign of compromise to the nerves or blood vessels due to swelling.    If you had SUTURES in the emergency department please have them removed in the prescribed time frame typically within 7-14 days.  You may shower but please do not bathe or swim.  Keep the wounds  clean and dry and covered with a clean dressing.  Please return if he have any signs of infection like redness or drainage or pain at the suture site.    Please take the full course of  any ANTIBIOTICS you were prescribed - incomplete courses of antibiotics can cause resistance to antibiotics in the future which will make it difficult to treat any infections you may have.         no

## 2023-11-06 ENCOUNTER — APPOINTMENT (OUTPATIENT)
Dept: ORTHOPEDIC SURGERY | Facility: CLINIC | Age: 78
End: 2023-11-06

## 2023-11-07 ENCOUNTER — APPOINTMENT (OUTPATIENT)
Dept: ORTHOPEDIC SURGERY | Facility: CLINIC | Age: 78
End: 2023-11-07

## 2024-02-22 NOTE — H&P PST ADULT - NSANTHAGERD_ENT_A_CORE
Nikki Lang (:  1948) is a 75 y.o. female.    Subjective   SUBJECTIVE/OBJECTIVE:  HPI  Location: [facility].  Room 302-B  All nursing and progress notes reviewed.    Nikki is seen today for routine monthly examination.  Offers no acute complaints.  Denies pain and discomfort. No concerns per nursing    Past Medical History:   Diagnosis Date    Depression     Duodenitis     Gastritis     Multiple sclerosis (HCC)     Osteoarthritis     Osteoporosis     Thyroid disease        No Known Allergies     Review of Systems   Constitutional:  Negative for appetite change, chills, diaphoresis, fatigue and fever.   HENT:  Negative for congestion, hearing loss, rhinorrhea and sore throat.    Eyes:  Negative for photophobia and visual disturbance.   Respiratory:  Negative for cough, shortness of breath and wheezing.    Cardiovascular:  Negative for chest pain and palpitations.   Gastrointestinal:  Negative for abdominal pain, constipation, diarrhea and vomiting.   Musculoskeletal:  Positive for gait problem. Negative for arthralgias, back pain, neck pain and neck stiffness.   Skin:  Negative for rash.   Neurological:  Positive for weakness.   All other systems reviewed and are negative.         Objective   Physical Exam  Constitutional:       General: She is not in acute distress.     Appearance: Normal appearance. She is not ill-appearing.   HENT:      Head: Normocephalic and atraumatic.      Right Ear: External ear normal.      Left Ear: External ear normal.      Nose: Nose normal.      Mouth/Throat:      Pharynx: Oropharynx is clear.   Eyes:      Extraocular Movements: Extraocular movements intact.      Conjunctiva/sclera: Conjunctivae normal.      Pupils: Pupils are equal, round, and reactive to light.   Cardiovascular:      Rate and Rhythm: Normal rate and regular rhythm.      Pulses: Normal pulses.      Heart sounds: Normal heart sounds.   Pulmonary:      Effort: Pulmonary effort is 
Yes

## 2024-05-17 ENCOUNTER — OFFICE (OUTPATIENT)
Dept: URBAN - METROPOLITAN AREA CLINIC 35 | Facility: CLINIC | Age: 79
Setting detail: OPHTHALMOLOGY
End: 2024-05-17
Payer: MEDICARE

## 2024-05-17 ENCOUNTER — RX ONLY (RX ONLY)
Age: 79
End: 2024-05-17

## 2024-05-17 DIAGNOSIS — H01.004: ICD-10-CM

## 2024-05-17 DIAGNOSIS — H25.13: ICD-10-CM

## 2024-05-17 DIAGNOSIS — H25.89: ICD-10-CM

## 2024-05-17 DIAGNOSIS — H11.041: ICD-10-CM

## 2024-05-17 DIAGNOSIS — H00.022: ICD-10-CM

## 2024-05-17 DIAGNOSIS — H43.811: ICD-10-CM

## 2024-05-17 DIAGNOSIS — H35.40: ICD-10-CM

## 2024-05-17 DIAGNOSIS — H11.153: ICD-10-CM

## 2024-05-17 DIAGNOSIS — H16.222: ICD-10-CM

## 2024-05-17 DIAGNOSIS — H00.025: ICD-10-CM

## 2024-05-17 PROCEDURE — 99204 OFFICE O/P NEW MOD 45 MIN: CPT | Performed by: OPHTHALMOLOGY

## 2024-05-17 ASSESSMENT — LID EXAM ASSESSMENTS
OS_COMMENTS: TELANGIECTASIA, MISSING GLANDS
OD_MEIBOMITIS: RLL 1+ 2+
OS_BLEPHARITIS: LUL 1+
OS_MEIBOMITIS: LLL 2+ 3+
OD_COMMENTS: TELANGIECTASIA, HYPEREMIA, MISSING GLANDS

## 2024-05-17 ASSESSMENT — CONFRONTATIONAL VISUAL FIELD TEST (CVF)
OD_FINDINGS: FULL
OS_FINDINGS: FULL

## 2024-05-20 ENCOUNTER — APPOINTMENT (OUTPATIENT)
Dept: ORTHOPEDIC SURGERY | Facility: CLINIC | Age: 79
End: 2024-05-20
Payer: MEDICARE

## 2024-05-20 VITALS — HEIGHT: 72 IN | BODY MASS INDEX: 23.03 KG/M2 | WEIGHT: 170 LBS

## 2024-05-20 DIAGNOSIS — Q76.2 CONGENITAL SPONDYLOLISTHESIS: ICD-10-CM

## 2024-05-20 PROCEDURE — 99214 OFFICE O/P EST MOD 30 MIN: CPT

## 2024-05-20 PROCEDURE — 72100 X-RAY EXAM L-S SPINE 2/3 VWS: CPT

## 2024-05-20 NOTE — PHYSICAL EXAM
[Cane] : ambulates with cane [] : Motor: [LE] : Sensory: Intact in bilateral lower extremities [ALL] : dorsalis pedis, posterior tibial, femoral, popliteal, and radial 2+ and symmetric bilaterally [Normal] : Oriented to person, place, and time, insight and judgement were intact and the affect was normal [Michael's Sign] : negative Michael's sign [Pronator Drift] : negative pronator drift [SLR] : negative straight leg raise [de-identified] : Lumbar ROM: limited \par  NTTP L spine and b/l paraspinals L region. \par  Skin intact L spine. No rashes, ulcers, blisters. \par  No lymphedema. \par  LLE internal/external rotation produces pain\par   [de-identified] : 2 views AP and Lat of LS Spine from E53-Ipwyxa 05/20/2024. Read and dictated by Dr. Dalton Cárdenas Board Certified orthopaedic surgeon [id number] PLIF  2 views AP and Lat of LS Spine from F89-Zrqpqx 05/09/22 . Read and dictated by Dr. Dalton Cárdenas Board Certified orthopaedic surgeon [id number] PLIF  2 views AP and Lat of LS Spine from K40-Psloev 1/3//2022. Read and dictated by Dr. Dalton Cárdenas Board Certified orthopaedic surgeon [id number] PLIF  2 views AP and Lat of LS Spine from B53-Tzfyhs 09/20/21. Read and dictated by Dr. Dalton Cárdenas Board Certified orthopaedic surgeon [id number] PLIF  2 views AP and Lat of LS Spine from K31-Ddmoxj 08/14/21. Read and dictated by Dr. Dalton Cárdenas Board Certified orthopaedic surgeon [id number] PLIF  2 views AP and Lat of LS Spine from P83-Bmkxxo 07/12/21. Read and dictated by Dr. Dalton Cárdenas Board Certified orthopaedic surgeon [id number] PLIF

## 2024-05-20 NOTE — DISCUSSION/SUMMARY
[de-identified] : Left sided foot pain still bothering patient, recommend MRI of L Spine with/without contrast L Spine, foot and ankle referral, EMG/NCV BLE, pain consult.   Pt denies fever, chills, weight changes, loss of bladder control, bowel incontinence or urinary retention or saddle anesthesia The patient's past medical history, past surgical history, medications, allergies, and social history were reviewed by me today with the patient and documented accordingly. In addition, the patient's family history, which is noncontributory to this visit, was also reviewed.

## 2024-05-20 NOTE — HISTORY OF PRESENT ILLNESS
[None] : No exacerbating factors are noted [Acetaminophen] : relieved by acetaminophen [Rest] : relieved by rest [de-identified] : Pt is s/p lumbar laminectomy and fusion L3-S1 on 05/04/21. Presents today for f/u.  Pt states he has occasional low back pain that is present when walking . He also states he still has intermittent redness/swelling on top of left foot as well as stiffness on left foot. Pt denies radiating pain,tingling, or weakness on B/L LE. Pt takes Tylenol PRN. Pt ambulates without assistance. Pt denies fever, chills, weight changes, loss of bladder control, bowel incontinence or urinary retention or saddle anesthesia. Pt f/u with Dr Bender s/p left total hip arthroplasty 03/2022.\par   [Ataxia] : no ataxia [Incontinence] : no incontinence [Loss of Dexterity] : good dexterity [Urinary Ret.] : no urinary retention

## 2024-06-02 ENCOUNTER — OUTPATIENT (OUTPATIENT)
Dept: OUTPATIENT SERVICES | Facility: HOSPITAL | Age: 79
LOS: 1 days | End: 2024-06-02
Payer: MEDICARE

## 2024-06-02 ENCOUNTER — APPOINTMENT (OUTPATIENT)
Dept: MRI IMAGING | Facility: CLINIC | Age: 79
End: 2024-06-02
Payer: MEDICARE

## 2024-06-02 DIAGNOSIS — Z90.49 ACQUIRED ABSENCE OF OTHER SPECIFIED PARTS OF DIGESTIVE TRACT: Chronic | ICD-10-CM

## 2024-06-02 DIAGNOSIS — Z00.8 ENCOUNTER FOR OTHER GENERAL EXAMINATION: ICD-10-CM

## 2024-06-02 DIAGNOSIS — Z98.890 OTHER SPECIFIED POSTPROCEDURAL STATES: Chronic | ICD-10-CM

## 2024-06-02 PROCEDURE — A9585: CPT

## 2024-06-02 PROCEDURE — 72158 MRI LUMBAR SPINE W/O & W/DYE: CPT | Mod: 26,MH

## 2024-06-02 PROCEDURE — 72158 MRI LUMBAR SPINE W/O & W/DYE: CPT

## 2024-06-12 ENCOUNTER — OFFICE (OUTPATIENT)
Dept: URBAN - METROPOLITAN AREA CLINIC 34 | Facility: CLINIC | Age: 79
Setting detail: OPHTHALMOLOGY
End: 2024-06-12
Payer: MEDICARE

## 2024-06-12 DIAGNOSIS — H25.89: ICD-10-CM

## 2024-06-12 DIAGNOSIS — H25.13: ICD-10-CM

## 2024-06-12 DIAGNOSIS — H25.12: ICD-10-CM

## 2024-06-12 PROBLEM — H16.422 CORNEAL PANNUS; LEFT EYE: Status: ACTIVE | Noted: 2024-06-12

## 2024-06-12 PROCEDURE — 92136 OPHTHALMIC BIOMETRY: CPT | Mod: 26,LT | Performed by: OPHTHALMOLOGY

## 2024-06-12 PROCEDURE — 92136 OPHTHALMIC BIOMETRY: CPT | Mod: TC | Performed by: OPHTHALMOLOGY

## 2024-06-12 PROCEDURE — 99213 OFFICE O/P EST LOW 20 MIN: CPT | Performed by: OPHTHALMOLOGY

## 2024-06-12 ASSESSMENT — LID EXAM ASSESSMENTS
OD_MEIBOMITIS: RLL 1+ 2+
OS_COMMENTS: TELANGIECTASIA, MISSING GLANDS
OD_COMMENTS: TELANGIECTASIA, HYPEREMIA, MISSING GLANDS
OS_MEIBOMITIS: LLL 2+ 3+
OS_BLEPHARITIS: LUL 1+

## 2024-06-12 ASSESSMENT — CONFRONTATIONAL VISUAL FIELD TEST (CVF)
OD_FINDINGS: FULL
OS_FINDINGS: FULL

## 2024-06-14 PROBLEM — H25.12 CATARACT SENILE NUCLEAR SCLEROSIS; RIGHT EYE, LEFT EYE, BOTH EYES: Status: ACTIVE | Noted: 2024-06-12

## 2024-06-14 PROBLEM — H25.11 CATARACT SENILE NUCLEAR SCLEROSIS; RIGHT EYE, LEFT EYE, BOTH EYES: Status: ACTIVE | Noted: 2024-06-12

## 2024-06-14 PROBLEM — H25.13 CATARACT SENILE NUCLEAR SCLEROSIS; RIGHT EYE, LEFT EYE, BOTH EYES: Status: ACTIVE | Noted: 2024-06-12

## 2024-08-08 ENCOUNTER — AMBULATORY SURGERY CENTER (OUTPATIENT)
Dept: URBAN - METROPOLITAN AREA SURGERY 7 | Facility: SURGERY | Age: 79
Setting detail: OPHTHALMOLOGY
End: 2024-08-08
Payer: MEDICARE

## 2024-08-08 DIAGNOSIS — H57.03: ICD-10-CM

## 2024-08-08 DIAGNOSIS — H25.12: ICD-10-CM

## 2024-08-08 PROCEDURE — 66982 XCAPSL CTRC RMVL CPLX WO ECP: CPT | Mod: LT | Performed by: OPHTHALMOLOGY

## 2024-08-09 ENCOUNTER — DOCTOR'S OFFICE (OUTPATIENT)
Age: 79
Setting detail: OPHTHALMOLOGY
End: 2024-08-09
Payer: MEDICARE

## 2024-08-09 ENCOUNTER — RX ONLY (RX ONLY)
Age: 79
End: 2024-08-09

## 2024-08-09 DIAGNOSIS — H25.89: ICD-10-CM

## 2024-08-09 DIAGNOSIS — Z96.1: ICD-10-CM

## 2024-08-09 DIAGNOSIS — H16.222: ICD-10-CM

## 2024-08-09 DIAGNOSIS — H16.422: ICD-10-CM

## 2024-08-09 PROCEDURE — 99024 POSTOP FOLLOW-UP VISIT: CPT | Performed by: OPHTHALMOLOGY

## 2024-08-09 ASSESSMENT — CONFRONTATIONAL VISUAL FIELD TEST (CVF)
OS_FINDINGS: FULL
OD_FINDINGS: FULL

## 2024-08-16 ENCOUNTER — RX ONLY (RX ONLY)
Age: 79
End: 2024-08-16

## 2024-08-16 ENCOUNTER — DOCTOR'S OFFICE (OUTPATIENT)
Age: 79
Setting detail: OPHTHALMOLOGY
End: 2024-08-16
Payer: MEDICARE

## 2024-08-16 DIAGNOSIS — Z96.1: ICD-10-CM

## 2024-08-16 DIAGNOSIS — H16.222: ICD-10-CM

## 2024-08-16 DIAGNOSIS — H25.89: ICD-10-CM

## 2024-08-16 DIAGNOSIS — H16.422: ICD-10-CM

## 2024-08-16 PROBLEM — H25.11 CATARACT SENILE NUCLEAR SCLEROSIS; RIGHT EYE: Status: ACTIVE | Noted: 2024-08-09

## 2024-08-16 PROBLEM — H11.152 PINGUECULA;  , LEFT EYE: Status: ACTIVE | Noted: 2024-08-09

## 2024-08-16 PROCEDURE — 99024 POSTOP FOLLOW-UP VISIT: CPT | Performed by: OPHTHALMOLOGY

## 2024-09-10 ENCOUNTER — DOCTOR'S OFFICE (OUTPATIENT)
Facility: LOCATION | Age: 79
Setting detail: OPHTHALMOLOGY
End: 2024-09-10
Payer: MEDICARE

## 2024-09-10 DIAGNOSIS — H16.222: ICD-10-CM

## 2024-09-10 DIAGNOSIS — Z96.1: ICD-10-CM

## 2024-09-10 DIAGNOSIS — H25.89: ICD-10-CM

## 2024-09-10 DIAGNOSIS — H11.153: ICD-10-CM

## 2024-09-10 DIAGNOSIS — H16.422: ICD-10-CM

## 2024-09-10 DIAGNOSIS — H11.041: ICD-10-CM

## 2024-09-10 DIAGNOSIS — H25.11: ICD-10-CM

## 2024-09-10 PROCEDURE — 99024 POSTOP FOLLOW-UP VISIT: CPT | Performed by: OPHTHALMOLOGY

## 2024-12-11 ENCOUNTER — DOCTOR'S OFFICE (OUTPATIENT)
Facility: LOCATION | Age: 79
Setting detail: OPHTHALMOLOGY
End: 2024-12-11
Payer: MEDICARE

## 2024-12-11 DIAGNOSIS — H11.041: ICD-10-CM

## 2024-12-11 DIAGNOSIS — H16.222: ICD-10-CM

## 2024-12-11 DIAGNOSIS — Z96.1: ICD-10-CM

## 2024-12-11 DIAGNOSIS — H25.89: ICD-10-CM

## 2024-12-11 DIAGNOSIS — H11.153: ICD-10-CM

## 2024-12-11 DIAGNOSIS — H16.422: ICD-10-CM

## 2024-12-11 DIAGNOSIS — H25.11: ICD-10-CM

## 2024-12-11 PROCEDURE — 99213 OFFICE O/P EST LOW 20 MIN: CPT | Performed by: OPHTHALMOLOGY

## 2024-12-11 ASSESSMENT — KERATOMETRY
OS_K1POWER_DIOPTERS: 41.25
OS_K2POWER_DIOPTERS: 42.00
OD_K1POWER_DIOPTERS: 42.00
OS_AXISANGLE_DEGREES: 178
OD_K2POWER_DIOPTERS: 42.50
OD_AXISANGLE_DEGREES: 054

## 2024-12-11 ASSESSMENT — TONOMETRY
OD_IOP_MMHG: 14
OS_IOP_MMHG: 14

## 2024-12-11 ASSESSMENT — REFRACTION_MANIFEST
OD_VA1: 20/30
OS_CYLINDER: +0.75
OS_SPHERE: PLANO
OS_AXIS: 010
OS_VA1: 20/40
OD_CYLINDER: +1.25
OD_SPHERE: +0.25
OD_AXIS: 010

## 2024-12-11 ASSESSMENT — REFRACTION_AUTOREFRACTION
OD_CYLINDER: +1.25
OD_SPHERE: -0.50
OD_AXIS: 005
OS_AXIS: 004
OS_CYLINDER: +1.25
OS_SPHERE: -0.50

## 2024-12-11 ASSESSMENT — VASCULARIZATION: OS_VASCULARIZATION: PANNUS

## 2024-12-11 ASSESSMENT — VISUAL ACUITY
OS_BCVA: 20/40-2
OD_BCVA: 20/25-2

## 2024-12-11 ASSESSMENT — CONFRONTATIONAL VISUAL FIELD TEST (CVF)
OD_FINDINGS: FULL
OS_FINDINGS: FULL

## 2024-12-11 ASSESSMENT — SUPERFICIAL PUNCTATE KERATITIS (SPK): OS_SPK: 2+ 3+

## 2024-12-11 ASSESSMENT — CORNEAL PTERYGIUM: OD_PTERYGIUM: TEMPORAL 1MM

## 2025-02-05 ENCOUNTER — DOCTOR'S OFFICE (OUTPATIENT)
Facility: LOCATION | Age: 80
Setting detail: OPHTHALMOLOGY
End: 2025-02-05
Payer: MEDICARE

## 2025-02-05 DIAGNOSIS — H16.223: ICD-10-CM

## 2025-02-05 DIAGNOSIS — H11.041: ICD-10-CM

## 2025-02-05 DIAGNOSIS — H11.151: ICD-10-CM

## 2025-02-05 DIAGNOSIS — H25.11: ICD-10-CM

## 2025-02-05 DIAGNOSIS — H16.422: ICD-10-CM

## 2025-02-05 DIAGNOSIS — H25.89: ICD-10-CM

## 2025-02-05 DIAGNOSIS — Z96.1: ICD-10-CM

## 2025-02-05 PROCEDURE — 99214 OFFICE O/P EST MOD 30 MIN: CPT | Performed by: OPHTHALMOLOGY

## 2025-02-05 ASSESSMENT — REFRACTION_MANIFEST
OD_VA1: 20/30
OS_AXIS: 010
OS_CYLINDER: +0.75
OS_SPHERE: PLANO
OD_CYLINDER: +1.25
OD_SPHERE: +0.25
OD_AXIS: 010
OS_VA1: 20/40

## 2025-02-05 ASSESSMENT — SUPERFICIAL PUNCTATE KERATITIS (SPK)
OD_SPK: 2+
OS_SPK: 3+

## 2025-02-05 ASSESSMENT — REFRACTION_AUTOREFRACTION
OD_AXIS: 178
OS_CYLINDER: +1.25
OS_AXIS: 003
OD_CYLINDER: +1.50
OS_SPHERE: -0.25
OD_SPHERE: -1.00

## 2025-02-05 ASSESSMENT — KERATOMETRY
OD_AXISANGLE_DEGREES: 088
OS_K2POWER_DIOPTERS: 42.00
OD_K2POWER_DIOPTERS: 44.00
OD_K1POWER_DIOPTERS: 42.25
OS_AXISANGLE_DEGREES: 023
OS_K1POWER_DIOPTERS: 41.50

## 2025-02-05 ASSESSMENT — CONFRONTATIONAL VISUAL FIELD TEST (CVF)
OD_FINDINGS: FULL
OS_FINDINGS: FULL

## 2025-02-05 ASSESSMENT — VASCULARIZATION: OS_VASCULARIZATION: PANNUS

## 2025-02-05 ASSESSMENT — CORNEAL PTERYGIUM: OD_PTERYGIUM: NASAL 1MM

## 2025-02-05 ASSESSMENT — DRY EYES - PHYSICIAN NOTES
OD_GENERALCOMMENTS: INFERIOR LIMBUS
OS_GENERALCOMMENTS: INFERIORLY

## 2025-02-05 ASSESSMENT — VISUAL ACUITY
OD_BCVA: 20/30-
OS_BCVA: 20/40-1

## 2025-02-05 ASSESSMENT — TONOMETRY: OS_IOP_MMHG: 10

## 2025-05-28 NOTE — H&P PST ADULT - OCCUPATION

## 2025-06-12 ENCOUNTER — AMBULATORY SURGERY CENTER (OUTPATIENT)
Dept: URBAN - METROPOLITAN AREA SURGERY 7 | Facility: SURGERY | Age: 80
Setting detail: OPHTHALMOLOGY
End: 2025-06-12
Payer: MEDICARE

## 2025-08-11 ENCOUNTER — APPOINTMENT (OUTPATIENT)
Dept: ORTHOPEDIC SURGERY | Facility: CLINIC | Age: 80
End: 2025-08-11
Payer: MEDICARE

## 2025-08-11 VITALS — WEIGHT: 186 LBS | HEIGHT: 72 IN | BODY MASS INDEX: 25.19 KG/M2

## 2025-08-11 DIAGNOSIS — G89.29 PAIN IN RIGHT HIP: ICD-10-CM

## 2025-08-11 DIAGNOSIS — M16.11 UNILATERAL PRIMARY OSTEOARTHRITIS, RIGHT HIP: ICD-10-CM

## 2025-08-11 DIAGNOSIS — M25.551 PAIN IN RIGHT HIP: ICD-10-CM

## 2025-08-11 PROCEDURE — 99213 OFFICE O/P EST LOW 20 MIN: CPT

## 2025-08-11 PROCEDURE — 73502 X-RAY EXAM HIP UNI 2-3 VIEWS: CPT

## 2025-08-14 ENCOUNTER — AMBULATORY SURGERY CENTER (OUTPATIENT)
Dept: URBAN - METROPOLITAN AREA SURGERY 7 | Facility: SURGERY | Age: 80
Setting detail: OPHTHALMOLOGY
End: 2025-08-14
Payer: MEDICARE

## 2025-08-14 DIAGNOSIS — H11.041: ICD-10-CM

## 2025-08-14 PROCEDURE — 65426 REMOVAL OF EYE LESION: CPT | Mod: RT | Performed by: OPHTHALMOLOGY

## 2025-08-15 ENCOUNTER — DOCTOR'S OFFICE (OUTPATIENT)
Facility: LOCATION | Age: 80
Setting detail: OPHTHALMOLOGY
End: 2025-08-15
Payer: MEDICARE

## 2025-08-15 DIAGNOSIS — H16.221: ICD-10-CM

## 2025-08-15 DIAGNOSIS — H11.31: ICD-10-CM

## 2025-08-15 DIAGNOSIS — H11.041: ICD-10-CM

## 2025-08-15 PROCEDURE — 99024 POSTOP FOLLOW-UP VISIT: CPT | Performed by: OPHTHALMOLOGY

## 2025-08-15 ASSESSMENT — REFRACTION_MANIFEST
OD_SPHERE: +0.25
OS_VA1: 20/40
OS_CYLINDER: +0.75
OS_SPHERE: PLANO
OD_CYLINDER: +1.25
OD_VA1: 20/30
OD_AXIS: 010
OS_AXIS: 010

## 2025-08-15 ASSESSMENT — DRY EYES - PHYSICIAN NOTES: OS_GENERALCOMMENTS: INFERIORLY

## 2025-08-15 ASSESSMENT — KERATOMETRY
OD_K1POWER_DIOPTERS: 41.25
OS_K1POWER_DIOPTERS: 41.00
OS_AXISANGLE_DEGREES: 179
OD_K2POWER_DIOPTERS: 43.00
OD_AXISANGLE_DEGREES: 176
OS_K2POWER_DIOPTERS: 41.75

## 2025-08-15 ASSESSMENT — REFRACTION_AUTOREFRACTION
OS_SPHERE: PLANO
OS_CYLINDER: +1.25
OD_CYLINDER: +2.25
OD_AXIS: 173
OS_AXIS: 174
OD_SPHERE: -2.25

## 2025-08-15 ASSESSMENT — VASCULARIZATION: OS_VASCULARIZATION: PANNUS

## 2025-08-15 ASSESSMENT — CONFRONTATIONAL VISUAL FIELD TEST (CVF)
OD_FINDINGS: FULL
OS_FINDINGS: FULL

## 2025-08-15 ASSESSMENT — SUPERFICIAL PUNCTATE KERATITIS (SPK)
OS_SPK: 3+
OD_SPK: 3+

## 2025-08-15 ASSESSMENT — VISUAL ACUITY
OS_BCVA: 20/200
OD_BCVA: 20/

## (undated) DEVICE — BITE BLOCK ADULT 20 X 27MM (GREEN)

## (undated) DEVICE — TUBING ERBE CO2 OLYMPUS CONNECTOR

## (undated) DEVICE — SNARE OPTIMIZER SOFT POLYPECTOMY SMALL MINI OVAL

## (undated) DEVICE — Device

## (undated) DEVICE — FORCEP RADIAL JAW 4 W NDL 2.4MM 2.8MM 240CM ORANGE DISP

## (undated) DEVICE — ELCTR REM POLYHESIVE ADULT PT RETURN 15FT

## (undated) DEVICE — GLV 7.5 PROTEXIS (WHITE)

## (undated) DEVICE — KIT ENDO PROCEDURE CUST W/VLV

## (undated) DEVICE — GLV 6.5 PROTEXIS (WHITE)

## (undated) DEVICE — TUBING HYBRID CO2